# Patient Record
Sex: MALE | Race: WHITE | NOT HISPANIC OR LATINO | Employment: OTHER | URBAN - NONMETROPOLITAN AREA
[De-identification: names, ages, dates, MRNs, and addresses within clinical notes are randomized per-mention and may not be internally consistent; named-entity substitution may affect disease eponyms.]

---

## 2020-01-15 ENCOUNTER — APPOINTMENT (OUTPATIENT)
Dept: CT IMAGING | Facility: HOSPITAL | Age: 85
End: 2020-01-15

## 2020-01-15 ENCOUNTER — HOSPITAL ENCOUNTER (INPATIENT)
Facility: HOSPITAL | Age: 85
LOS: 2 days | Discharge: HOME | End: 2020-01-17
Attending: EMERGENCY MEDICINE | Admitting: FAMILY MEDICINE

## 2020-01-15 DIAGNOSIS — K92.1 BLACK STOOL: ICD-10-CM

## 2020-01-15 DIAGNOSIS — R19.5 HEME POSITIVE STOOL: ICD-10-CM

## 2020-01-15 DIAGNOSIS — E87.1 HYPONATREMIA: ICD-10-CM

## 2020-01-15 DIAGNOSIS — K92.2 UPPER GI BLEED: Primary | ICD-10-CM

## 2020-01-15 LAB
ALBUMIN SERPL-MCNC: 4.1 G/DL (ref 3.5–5.2)
ALBUMIN/GLOB SERPL: 1.2 G/DL
ALP SERPL-CCNC: 90 U/L (ref 39–117)
ALT SERPL W P-5'-P-CCNC: 15 U/L (ref 1–41)
ANION GAP SERPL CALCULATED.3IONS-SCNC: 12 MMOL/L (ref 5–15)
AST SERPL-CCNC: 20 U/L (ref 1–40)
BASOPHILS # BLD AUTO: 0.02 10*3/MM3 (ref 0–0.2)
BASOPHILS NFR BLD AUTO: 0.3 % (ref 0–1.5)
BILIRUB SERPL-MCNC: 0.6 MG/DL (ref 0.2–1.2)
BILIRUB UR QL STRIP: NEGATIVE
BUN BLD-MCNC: 10 MG/DL (ref 8–23)
BUN/CREAT SERPL: 9.7 (ref 7–25)
CALCIUM SPEC-SCNC: 9.6 MG/DL (ref 8.6–10.5)
CHLORIDE SERPL-SCNC: 87 MMOL/L (ref 98–107)
CLARITY UR: CLEAR
CO2 SERPL-SCNC: 24 MMOL/L (ref 22–29)
COLOR UR: YELLOW
CREAT BLD-MCNC: 1.03 MG/DL (ref 0.76–1.27)
D-LACTATE SERPL-SCNC: 1.4 MMOL/L (ref 0.5–2)
DEPRECATED RDW RBC AUTO: 37.2 FL (ref 37–54)
DEVELOPER EXPIRATION DATE: ABNORMAL
DEVELOPER LOT NUMBER: 161
EOSINOPHIL # BLD AUTO: 0.12 10*3/MM3 (ref 0–0.4)
EOSINOPHIL NFR BLD AUTO: 1.6 % (ref 0.3–6.2)
ERYTHROCYTE [DISTWIDTH] IN BLOOD BY AUTOMATED COUNT: 13.1 % (ref 12.3–15.4)
EXPIRATION DATE: ABNORMAL
FECAL OCCULT BLOOD SCREEN, POC: POSITIVE
GFR SERPL CREATININE-BSD FRML MDRD: 68 ML/MIN/1.73
GLOBULIN UR ELPH-MCNC: 3.3 GM/DL
GLUCOSE BLD-MCNC: 111 MG/DL (ref 65–99)
GLUCOSE UR STRIP-MCNC: NEGATIVE MG/DL
HCT VFR BLD AUTO: 35.9 % (ref 37.5–51)
HCT VFR BLD AUTO: 38.9 % (ref 37.5–51)
HCT VFR BLD AUTO: 39.9 % (ref 37.5–51)
HGB BLD-MCNC: 12.7 G/DL (ref 13–17.7)
HGB BLD-MCNC: 13.9 G/DL (ref 13–17.7)
HGB BLD-MCNC: 14.6 G/DL (ref 13–17.7)
HGB UR QL STRIP.AUTO: NEGATIVE
HOLD SPECIMEN: NORMAL
HOLD SPECIMEN: NORMAL
IMM GRANULOCYTES # BLD AUTO: 0.02 10*3/MM3 (ref 0–0.05)
IMM GRANULOCYTES NFR BLD AUTO: 0.3 % (ref 0–0.5)
KETONES UR QL STRIP: ABNORMAL
LEUKOCYTE ESTERASE UR QL STRIP.AUTO: NEGATIVE
LIPASE SERPL-CCNC: 27 U/L (ref 13–60)
LYMPHOCYTES # BLD AUTO: 1.6 10*3/MM3 (ref 0.7–3.1)
LYMPHOCYTES NFR BLD AUTO: 20.9 % (ref 19.6–45.3)
Lab: 161
MCH RBC QN AUTO: 28.7 PG (ref 26.6–33)
MCHC RBC AUTO-ENTMCNC: 36.6 G/DL (ref 31.5–35.7)
MCV RBC AUTO: 78.5 FL (ref 79–97)
MONOCYTES # BLD AUTO: 0.6 10*3/MM3 (ref 0.1–0.9)
MONOCYTES NFR BLD AUTO: 7.8 % (ref 5–12)
NEGATIVE CONTROL: NEGATIVE
NEUTROPHILS # BLD AUTO: 5.3 10*3/MM3 (ref 1.7–7)
NEUTROPHILS NFR BLD AUTO: 69.1 % (ref 42.7–76)
NITRITE UR QL STRIP: NEGATIVE
NRBC BLD AUTO-RTO: 0 /100 WBC (ref 0–0.2)
PH UR STRIP.AUTO: 8.5 [PH] (ref 5–8)
PLATELET # BLD AUTO: 349 10*3/MM3 (ref 140–450)
PMV BLD AUTO: 8.2 FL (ref 6–12)
POSITIVE CONTROL: POSITIVE
POTASSIUM BLD-SCNC: 3.4 MMOL/L (ref 3.5–5.2)
PROT SERPL-MCNC: 7.4 G/DL (ref 6–8.5)
PROT UR QL STRIP: NEGATIVE
RBC # BLD AUTO: 5.08 10*6/MM3 (ref 4.14–5.8)
SODIUM BLD-SCNC: 123 MMOL/L (ref 136–145)
SP GR UR STRIP: 1.01 (ref 1–1.03)
UROBILINOGEN UR QL STRIP: ABNORMAL
WBC NRBC COR # BLD: 7.66 10*3/MM3 (ref 3.4–10.8)
WHOLE BLOOD HOLD SPECIMEN: NORMAL
WHOLE BLOOD HOLD SPECIMEN: NORMAL

## 2020-01-15 PROCEDURE — 80053 COMPREHEN METABOLIC PANEL: CPT | Performed by: EMERGENCY MEDICINE

## 2020-01-15 PROCEDURE — 25010000002 IOPAMIDOL 61 % SOLUTION: Performed by: EMERGENCY MEDICINE

## 2020-01-15 PROCEDURE — 85018 HEMOGLOBIN: CPT | Performed by: FAMILY MEDICINE

## 2020-01-15 PROCEDURE — 85014 HEMATOCRIT: CPT | Performed by: FAMILY MEDICINE

## 2020-01-15 PROCEDURE — 83690 ASSAY OF LIPASE: CPT | Performed by: EMERGENCY MEDICINE

## 2020-01-15 PROCEDURE — 99222 1ST HOSP IP/OBS MODERATE 55: CPT | Performed by: NURSE PRACTITIONER

## 2020-01-15 PROCEDURE — 83605 ASSAY OF LACTIC ACID: CPT | Performed by: EMERGENCY MEDICINE

## 2020-01-15 PROCEDURE — 25010000002 ONDANSETRON PER 1 MG: Performed by: EMERGENCY MEDICINE

## 2020-01-15 PROCEDURE — 82270 OCCULT BLOOD FECES: CPT | Performed by: EMERGENCY MEDICINE

## 2020-01-15 PROCEDURE — 99284 EMERGENCY DEPT VISIT MOD MDM: CPT

## 2020-01-15 PROCEDURE — 81003 URINALYSIS AUTO W/O SCOPE: CPT | Performed by: EMERGENCY MEDICINE

## 2020-01-15 PROCEDURE — 25010000002 MORPHINE SULFATE (PF) 2 MG/ML SOLUTION: Performed by: EMERGENCY MEDICINE

## 2020-01-15 PROCEDURE — 74177 CT ABD & PELVIS W/CONTRAST: CPT

## 2020-01-15 PROCEDURE — 85025 COMPLETE CBC W/AUTO DIFF WBC: CPT | Performed by: EMERGENCY MEDICINE

## 2020-01-15 RX ORDER — ONDANSETRON 2 MG/ML
4 INJECTION INTRAMUSCULAR; INTRAVENOUS ONCE
Status: COMPLETED | OUTPATIENT
Start: 2020-01-15 | End: 2020-01-15

## 2020-01-15 RX ORDER — AMLODIPINE BESYLATE 5 MG/1
5 TABLET ORAL 2 TIMES DAILY
Status: DISCONTINUED | OUTPATIENT
Start: 2020-01-15 | End: 2020-01-17 | Stop reason: HOSPADM

## 2020-01-15 RX ORDER — LOSARTAN POTASSIUM 50 MG/1
100 TABLET ORAL
Status: DISCONTINUED | OUTPATIENT
Start: 2020-01-15 | End: 2020-01-15

## 2020-01-15 RX ORDER — AMLODIPINE BESYLATE 5 MG/1
5 TABLET ORAL 2 TIMES DAILY
COMMUNITY

## 2020-01-15 RX ORDER — LOSARTAN POTASSIUM AND HYDROCHLOROTHIAZIDE 12.5; 1 MG/1; MG/1
1 TABLET ORAL DAILY
COMMUNITY
End: 2020-01-17 | Stop reason: HOSPADM

## 2020-01-15 RX ORDER — PROMETHAZINE HYDROCHLORIDE 25 MG/ML
12.5 INJECTION, SOLUTION INTRAMUSCULAR; INTRAVENOUS ONCE
Status: DISCONTINUED | OUTPATIENT
Start: 2020-01-15 | End: 2020-01-17 | Stop reason: HOSPADM

## 2020-01-15 RX ORDER — FAMOTIDINE 10 MG/ML
20 INJECTION, SOLUTION INTRAVENOUS ONCE
Status: COMPLETED | OUTPATIENT
Start: 2020-01-15 | End: 2020-01-15

## 2020-01-15 RX ORDER — MORPHINE SULFATE 2 MG/ML
2 INJECTION, SOLUTION INTRAMUSCULAR; INTRAVENOUS ONCE
Status: COMPLETED | OUTPATIENT
Start: 2020-01-15 | End: 2020-01-15

## 2020-01-15 RX ORDER — AMLODIPINE BESYLATE 10 MG/1
10 TABLET ORAL
Status: DISCONTINUED | OUTPATIENT
Start: 2020-01-15 | End: 2020-01-15

## 2020-01-15 RX ORDER — SODIUM CHLORIDE 0.9 % (FLUSH) 0.9 %
10 SYRINGE (ML) INJECTION AS NEEDED
Status: DISCONTINUED | OUTPATIENT
Start: 2020-01-15 | End: 2020-01-17 | Stop reason: HOSPADM

## 2020-01-15 RX ORDER — SODIUM CHLORIDE 9 MG/ML
75 INJECTION, SOLUTION INTRAVENOUS CONTINUOUS
Status: DISCONTINUED | OUTPATIENT
Start: 2020-01-15 | End: 2020-01-16

## 2020-01-15 RX ORDER — HYDROCHLOROTHIAZIDE 25 MG/1
12.5 TABLET ORAL DAILY
Status: DISCONTINUED | OUTPATIENT
Start: 2020-01-15 | End: 2020-01-15

## 2020-01-15 RX ORDER — LOSARTAN POTASSIUM 50 MG/1
100 TABLET ORAL DAILY
Status: DISCONTINUED | OUTPATIENT
Start: 2020-01-16 | End: 2020-01-17 | Stop reason: HOSPADM

## 2020-01-15 RX ORDER — MORPHINE SULFATE 2 MG/ML
2 INJECTION, SOLUTION INTRAMUSCULAR; INTRAVENOUS EVERY 4 HOURS PRN
Status: DISCONTINUED | OUTPATIENT
Start: 2020-01-15 | End: 2020-01-17 | Stop reason: HOSPADM

## 2020-01-15 RX ADMIN — FAMOTIDINE 20 MG: 10 INJECTION, SOLUTION INTRAVENOUS at 09:33

## 2020-01-15 RX ADMIN — ONDANSETRON HYDROCHLORIDE 4 MG: 2 SOLUTION INTRAMUSCULAR; INTRAVENOUS at 09:33

## 2020-01-15 RX ADMIN — IOPAMIDOL 100 ML: 612 INJECTION, SOLUTION INTRAVENOUS at 10:52

## 2020-01-15 RX ADMIN — MORPHINE SULFATE 2 MG: 2 INJECTION, SOLUTION INTRAMUSCULAR; INTRAVENOUS at 09:33

## 2020-01-15 RX ADMIN — SODIUM CHLORIDE 1000 ML: 9 INJECTION, SOLUTION INTRAVENOUS at 09:33

## 2020-01-15 RX ADMIN — SODIUM CHLORIDE 75 ML/HR: 9 INJECTION, SOLUTION INTRAVENOUS at 22:07

## 2020-01-15 RX ADMIN — AMLODIPINE BESYLATE 5 MG: 10 TABLET ORAL at 21:06

## 2020-01-15 RX ADMIN — SODIUM CHLORIDE 8 MG/HR: 900 INJECTION INTRAVENOUS at 19:41

## 2020-01-15 RX ADMIN — SODIUM CHLORIDE 8 MG/HR: 900 INJECTION INTRAVENOUS at 15:01

## 2020-01-15 NOTE — CONSULTS
"[facility]  Initial Inpatient Consult Note    Referring Provider: Amado Falcon MD    Reason for Consultation: GIB    Subjective     History of present illness:        88 yo admitted with gi bleed and hyponatremia.     Abdominal pain x 1 week that has been intermittent in ruq and radiates to his back. He is not sure if food triggers the pain but has noted postprandial nausea. He has had \" dry heaves \" with some phlegm that has come up with no blood noted. Today he did vomit in the Emergency Room and was green in color. The pain has worsened over the last 3-4 days. Has not been eating or drinking much over the last few days.  Noted black stool yesterday that was formed. No pepto or iron pills.   Has noted weakness and some sob. No chest pain.   No fever. No hematemesis. No wt loss.     He does have history of heartburn for several years and does use otc antacid daily that does help. He takes jasmin seltzer 2-3 d/wk for several years. Takes asa daily for years.  Denies history of pud. Denies history of egd or colonoscopy in past.       Ct abdomen/pelvis with contrast  IMPRESSION:  1. Large right inguinal hernia containing normal-appearing loops of  small bowel.  2. Small hiatal hernia.  3. Multiple urinary bladder diverticula.  4. Colonic diverticulosis without evidence of acute diverticulitis.  5. Atherosclerotic disease.      Labs: hgb 14.6, wbc normal, na 123, bun 10, creat 1.03, potassium 3.4, lipase normal, lactic acid normal,   Stool heme positive.     Past Medical History:  Past Medical History:   Diagnosis Date   • Hypertension        Past Surgical History:  Past Surgical History:   Procedure Laterality Date   • FINGER SURGERY          Social History:   Social History     Tobacco Use   • Smoking status: Never Smoker   Substance Use Topics   • Alcohol use: Never     Frequency: Never        Family History:  History reviewed. No pertinent family history.    Home Meds:  Medications Prior " to Admission   Medication Sig Dispense Refill Last Dose   • amLODIPine (NORVASC) 5 MG tablet Take 5 mg by mouth 2 (Two) Times a Day.   1/15/2020 at 0800   • losartan-hydrochlorothiazide (HYZAAR) 100-12.5 MG per tablet Take 1 tablet by mouth Daily.   1/15/2020 at 0800       Current Meds:  Current Facility-Administered Medications   Medication Dose Route Frequency Provider Last Rate Last Dose   • amLODIPine (NORVASC) tablet 10 mg  10 mg Oral Q24H Amado Falcon MD       • losartan (COZAAR) tablet 100 mg  100 mg Oral Q24H Amado Falcon MD       • Morphine sulfate (PF) injection 2 mg  2 mg Intravenous Q4H PRN Amado Falcon MD       • pantoprazole (PROTONIX) 40 mg/100 mL (0.4 mg/mL) in 0.9% NS IVPB  8 mg/hr Intravenous Continuous Amado Falcon MD 20 mL/hr at 01/15/20 1501 8 mg/hr at 01/15/20 1501   • promethazine (PHENERGAN) injection 12.5 mg  12.5 mg Intravenous Once Amado Falcon MD   Stopped at 01/15/20 1108   • sodium chloride 0.9 % flush 10 mL  10 mL Intravenous PRN Amado Falcon MD           Allergies:  No Known Allergies    Review of Systems  Review of Systems   Constitutional: Positive for fatigue. Negative for appetite change, chills, fever and unexpected weight change.   HENT: Negative for sore throat and trouble swallowing.    Eyes: Negative for visual disturbance.   Respiratory: Positive for shortness of breath (mild ). Negative for wheezing.    Cardiovascular: Negative for chest pain and palpitations.   Gastrointestinal: Positive for abdominal pain, nausea and vomiting. Negative for abdominal distention, constipation and diarrhea.        As mentioned in hpi   Genitourinary: Negative for difficulty urinating and hematuria.   Musculoskeletal: Negative for arthralgias and back pain.   Skin: Negative for color change and rash.   Neurological: Positive for weakness. Negative for dizziness, seizures, syncope, light-headedness and headaches.   Hematological:  Negative for adenopathy.   Psychiatric/Behavioral: Negative for confusion. The patient is not nervous/anxious.         Objective     Vital Signs  Temp:  [97.5 °F (36.4 °C)-98.2 °F (36.8 °C)] 97.5 °F (36.4 °C)  Heart Rate:  [] 86  Resp:  [15-18] 18  BP: (125-140)/(49-78) 140/78    Physical Exam:   Physical Exam   Constitutional: He appears well-developed and well-nourished. No distress.   HENT:   Head: Normocephalic and atraumatic.   Eyes: EOM are normal. No scleral icterus.   Neck: Neck supple. No JVD present.   Cardiovascular: Normal rate, regular rhythm and normal heart sounds.   Pulmonary/Chest: Effort normal and breath sounds normal.   Abdominal: Soft. Bowel sounds are normal. He exhibits no distension. There is tenderness (mild tenderness ruq ).   Musculoskeletal: Normal range of motion. He exhibits no deformity.   Neurological: He is alert.   Skin: Skin is warm and dry. No rash noted.   Psychiatric: He has a normal mood and affect. His behavior is normal.   Vitals reviewed.      Results Review:   I reviewed the patient's new clinical results.  I reviewed the patient's new imaging results and agree with the interpretation.  I reviewed the patient's other test results and agree with the interpretation    Lab Results (most recent)     Procedure Component Value Units Date/Time    POC Occult Blood Stool [[id number]]  (Abnormal) Collected:  01/15/20 1242    Specimen:  Stool Updated:  01/15/20 1242     Fecal Occult Blood Positive     Lot Number \[id number]\     Expiration Date \7/31/2020\     DEVELOPER LOT NUMBER \161\     DEVELOPER EXPIRATION DATE \7/31/2020\     Positive Control Positive     Negative Control Negative    [place] Draw [[id number]] Collected:  01/15/20 0932    Specimen:  Blood Updated:  01/15/20 1046    Narrative:       The following orders were created for panel order [place] Draw.  Procedure                               Abnormality         Status                     ---------                                -----------         ------                     Light Blue Top[[id number]]                                   Final result               Green Top (Gel)[[id number]]                                  Final result               Lavender Top[[id number]]                                     Final result               Red Top[[id number]]                                          Final result                 Please view results for these tests on the individual orders.    Light Blue Top [[id number]] Collected:  01/15/20 0932    Specimen:  Blood Updated:  01/15/20 1046     Extra Tube hold for add-on     Comment: Auto resulted       Green Top (Gel) [[id number]] Collected:  01/15/20 0932    Specimen:  Blood Updated:  01/15/20 1046     Extra Tube Hold for add-ons.     Comment: Auto resulted.       Red Top [[id number]] Collected:  01/15/20 0932    Specimen:  Blood Updated:  01/15/20 1046     Extra Tube Hold for add-ons.     Comment: Auto resulted.       Lavender Top [[id number]] Collected:  01/15/20 0932    Specimen:  Blood Updated:  01/15/20 1046     Extra Tube hold for add-on     Comment: Auto resulted       Urinalysis With Microscopic If Indicated (No Culture) - Urine, Clean Catch [[id number]]  (Abnormal) Collected:  01/15/20 1026    Specimen:  Urine, Clean Catch Updated:  01/15/20 1033     Color, UA Yellow     Appearance, UA Clear     pH, UA 8.5     Specific Gravity, UA 1.012     Glucose, UA Negative     Ketones, UA Trace     Bilirubin, UA Negative     Blood, UA Negative     Protein, UA Negative     Leuk Esterase, UA Negative     Nitrite, UA Negative     Urobilinogen, UA 0.2 E.U./dL    Narrative:       Urine microscopic not indicated.    Comprehensive Metabolic Panel [[id number]]  (Abnormal) Collected:  01/15/20 0932    Specimen:  Blood Updated:  01/15/20 1012     Glucose 111 mg/dL      BUN 10 mg/dL      Creatinine 1.03 mg/dL      Sodium 123 mmol/L      Potassium 3.4 mmol/L      Chloride 87 mmol/L      CO2 24.0 mmol/L      Calcium  9.6 mg/dL      Total Protein 7.4 g/dL      Albumin 4.10 g/dL      ALT (SGPT) 15 U/L      AST (SGOT) 20 U/L      Alkaline Phosphatase 90 U/L      Total Bilirubin 0.6 mg/dL      eGFR Non African Amer 68 mL/min/1.73      Globulin 3.3 gm/dL      A/G Ratio 1.2 g/dL      BUN/Creatinine Ratio 9.7     Anion Gap 12.0 mmol/L     Narrative:       GFR Normal >60  Chronic Kidney Disease <60  Kidney Failure <15      Lipase [[id number]]  (Normal) Collected:  01/15/20 0932    Specimen:  Blood Updated:  01/15/20 1012     Lipase 27 U/L     Lactic Acid, Plasma [[id number]]  (Normal) Collected:  01/15/20 0932    Specimen:  Blood Updated:  01/15/20 1002     Lactate 1.4 mmol/L     CBC & Differential [[id number]] Collected:  01/15/20 0932    Specimen:  Blood Updated:  01/15/20 0948    Narrative:       The following orders were created for panel order CBC & Differential.  Procedure                               Abnormality         Status                     ---------                               -----------         ------                     CBC Auto Differential[[id number]]        Abnormal            Final result                 Please view results for these tests on the individual orders.    CBC Auto Differential [[id number]]  (Abnormal) Collected:  01/15/20 0932    Specimen:  Blood Updated:  01/15/20 0948     WBC 7.66 10*3/mm3      RBC 5.08 10*6/mm3      Hemoglobin 14.6 g/dL      Hematocrit 39.9 %      MCV 78.5 fL      MCH 28.7 pg      MCHC 36.6 g/dL      RDW 13.1 %      RDW-SD 37.2 fl      MPV 8.2 fL      Platelets 349 10*3/mm3      Neutrophil % 69.1 %      Lymphocyte % 20.9 %      Monocyte % 7.8 %      Eosinophil % 1.6 %      Basophil % 0.3 %      Immature Grans % 0.3 %      Neutrophils, Absolute 5.30 10*3/mm3      Lymphocytes, Absolute 1.60 10*3/mm3      Monocytes, Absolute 0.60 10*3/mm3      Eosinophils, Absolute 0.12 10*3/mm3      Basophils, Absolute 0.02 10*3/mm3      Immature Grans, Absolute 0.02 10*3/mm3      nRBC 0.0 /100 WBC              Radiology:  Imaging Results (Last 24 Hours)     Procedure Component Value Units Date/Time    CT Abdomen Pelvis With Contrast [[id number]] Collected:  01/15/20 1158     Updated:  01/15/20 1212    Narrative:       EXAMINATION: CT ABDOMEN PELVIS W CONTRAST- 1/15/2020 11:58 AM CST     HISTORY: Abdominal pain     COMPARISON: None     DOSE: 528 mGy-cm     TECHNIQUE: Sequential imaging was performed from the lung bases through  the pubic symphysis after the administration of IV contrast.  Sagittal  and coronal reformations were made from the original source data and  reviewed. Automated exposure control was also utilized to decrease  patient radiation dose.     FINDINGS:   The visualized heart appears normal in size. The lung bases appear  clear.     The liver, gallbladder, pancreas, and adrenal glands appear grossly  normal. A tiny hypodensity is seen in the spleen, too small to  characterize. The kidneys enhance symmetrically and appear grossly  normal. The ureters are decompressed bilaterally.     The main portal and splenic veins and IVC appear grossly normal. The  abdominal aorta appears normal in caliber. The origins of the celiac  axis, superior mesenteric artery, and inferior mesenteric artery enhance  normally. There are scattered atherosclerotic calcifications of the  aorta and its branch vessels.     No pathologically enlarged central mesenteric or retroperitoneal lymph  nodes are identified.     There is a small hiatal hernia. The stomach and small bowel appear  normal in caliber. There are scattered colonic diverticula without  evidence of acute diverticulitis. No free air or free fluid is seen in  the abdomen. There is a tiny fat-containing umbilical hernia.     Multiple urinary bladder diverticula are present. Prostate appears  mildly enlarged. No free fluid is seen in the pelvis. There is a large  direct right inguinal hernia which contains normal-appearing loops of  small bowel. This results in  significant right scrotal enlargement. A  small fat-containing left inguinal hernia is also identified. No  pathologically enlarged pelvic or inguinal lymph nodes are identified.     Review of the visualized osseous structures demonstrates no acute or  aggressive lesions. Degenerative changes are noted in the spine.       Impression:       1. Large right inguinal hernia containing normal-appearing loops of  small bowel.  2. Small hiatal hernia.  3. Multiple urinary bladder diverticula.  4. Colonic diverticulosis without evidence of acute diverticulitis.  5. Atherosclerotic disease.           This report was finalized on 01/15/2020 12:09 by Dr. Alfredo Kaplan MD.            Assessment/Plan       Upper GI bleed      1. RUQ pain  2. Nausea/vomiting.   3. Heme pos stool.  4. Report of black stool  5. Asa and jasmin seltzer chronic use  6. Hyponatremia       Differential diagnoses discussed. I do recommend egd in am for further evaluation and he is agreeable. His sodium is low so will review in am prior to egd.   He is to get ivf's and recheck labs in am. Agree with protonix. May have clear liquids. Continue to monitor hgb and transfuse as needed.   Further orders pending egd results.              I discussed the patients findings and my recommendations with patient and family      EMR Dragon/transcription disclaimer:  Much of this encounter note is electronic transcription/translation of spoken language to printed text.  The electronic translation of spoken language may be erroneous, or at times, nonsensical words or phrases may be inadvertently transcribed.  Although I have reviewed the note for such errors, some may still exist.    Marina Palmer APRN 2:33 PM    · I have seen the patient personally with evaluation and plan of care reviewed and developed with APRN and nursing staff. Where indicated, I have addended and/or modified the above history of present illness, physical examination, and assessment and plan to reflect  my findings and impressions. Essential elements of the care plan were discussed with APRN above.  Agree with findings and assessment/plan as documented above.        Nathanael Meyer MD  01/15/20  3:19 PM

## 2020-01-15 NOTE — PLAN OF CARE
Patient c/o intermittent abdominal pain. Medication offered but refused. Up ad phylicia. No BM since transfer. Clear liquids until midnight. NPO after midnight. Voiding without difficulty. No signs of distress at this time. Will cont to monitor.  Problem: Patient Care Overview  Goal: Plan of Care Review  Outcome: Ongoing (interventions implemented as appropriate)  Goal: Individualization and Mutuality  Outcome: Ongoing (interventions implemented as appropriate)  Goal: Discharge Needs Assessment  Outcome: Ongoing (interventions implemented as appropriate)  Goal: Interprofessional Rounds/Family Conf  Outcome: Ongoing (interventions implemented as appropriate)     Problem: Gastrointestinal Bleeding (Adult)  Goal: Signs and Symptoms of Listed Potential Problems Will be Absent, Minimized or Managed (Gastrointestinal Bleeding)  Outcome: Ongoing (interventions implemented as appropriate)

## 2020-01-15 NOTE — H&P
[facility]  HISTORY AND PHYSICAL    Date of Admission: 1/15/2020  Primary Care Physician: Deon Lindsey MD    Subjective     Chief Complaint: Abdominal pain    History of Present Illness  Mr. Vazquez is a pleasant gentleman who works as a  and previously worked as a .  He is also an [employer].  He presents today endorsing abdominal pain and melena.      He says he's been having abdominal pain for a week now.  He says it's right upper quadrant pain at times.  He also has midepigastric pain at times.  He does take extra Aspirin at times for pain.  He denies using Motrin, Aleve, Mobic, or Relafen.  He denies tobacco or alcohol use.      His pain is sharp.  It is intermittent.  It radiates to his back.  He has associated nausea with it.  He has not been eating or drinking very well the last few days.          Review of Systems   Constitutional: Positive for fatigue. Negative for fever.   HENT: Negative for congestion and ear pain.    Eyes: Negative for redness and visual disturbance.   Respiratory: Negative for cough, shortness of breath and wheezing.    Cardiovascular: Negative for chest pain and palpitations.   Gastrointestinal: Positive for abdominal pain, blood in stool and nausea. Negative for diarrhea and vomiting.   Endocrine: Negative for cold intolerance and heat intolerance.   Genitourinary: Negative for dysuria and frequency.   Musculoskeletal: Negative for arthralgias and back pain.   Skin: Negative for rash and wound.   Neurological: Negative for dizziness and headaches.   Psychiatric/Behavioral: Negative for confusion. The patient is not nervous/anxious.         Otherwise complete ROS reviewed and negative except as mentioned in the HPI.    Past Medical History:   Past Medical History:   Diagnosis Date   • Hypertension      Past Surgical History:  Past Surgical History:   Procedure Laterality Date   • FINGER  "SURGERY       Social History:  reports that he has never smoked. He does not have any smokeless tobacco history on file. He reports that he does not drink alcohol or use drugs.    Family History: HTN    Allergies:  No Known Allergies  Medications:  Prior to Admission medications    Medication Sig Start Date End Date Taking? Authorizing Provider   AMLODIPINE BENZOATE PO Take  by mouth.   Yes Ngoc Mcduffie MD   losartan 100 MG tablet 100 mg, amLODIPine 10 MG tablet 10 mg, hydroCHLOROthiazide 12.5 MG 12.5 mg Take  by mouth.   Yes Ngoc Mcduffie MD     Objective     Vital Signs: /74   Pulse 82   Temp 98.2 °F (36.8 °C)   Resp 16   Ht 175.3 cm (69\")   Wt 68 kg (150 lb)   SpO2 99%   BMI 22.15 kg/m²   Physical Exam   Constitutional: He is oriented to person, place, and time. He appears well-developed and well-nourished.   HENT:   Head: Normocephalic and atraumatic.   Right Ear: External ear normal.   Left Ear: External ear normal.   Nose: Nose normal.   Mouth/Throat: Oropharynx is clear and moist.   Eyes: Pupils are equal, round, and reactive to light. Conjunctivae and EOM are normal. Right eye exhibits no discharge. Left eye exhibits no discharge. No scleral icterus.   Neck: Normal range of motion. Neck supple. No tracheal deviation present. No thyromegaly present.   Cardiovascular: Normal rate, regular rhythm, normal heart sounds and intact distal pulses. Exam reveals no gallop and no friction rub.   No murmur heard.  Pulmonary/Chest: Effort normal and breath sounds normal. No stridor. No respiratory distress. He has no wheezes. He has no rales. He exhibits no tenderness.   Abdominal: Soft. Bowel sounds are normal. He exhibits no distension and no mass. There is tenderness in the right upper quadrant and epigastric area. There is no rebound and no guarding. No hernia.   Musculoskeletal: Normal range of motion. He exhibits no edema or deformity.   Lymphadenopathy:     He has no cervical " adenopathy.   Neurological: He is alert and oriented to person, place, and time. He has normal reflexes. He displays normal reflexes. No cranial nerve deficit. He exhibits normal muscle tone. Coordination normal.   Skin: Skin is warm and dry. No rash noted. No erythema. No pallor.   Decreased skin turgor   Psychiatric: He has a normal mood and affect. His behavior is normal. Judgment and thought content normal.   Vitals reviewed.      Results Reviewed:  Lab Results (last 24 hours)     Procedure Component Value Units Date/Time    POC Occult Blood Stool [[id number]]  (Abnormal) Collected:  01/15/20 1242    Specimen:  Stool Updated:  01/15/20 1242     Fecal Occult Blood Positive     Lot Number \[id number]\     Expiration Date \7/31/2020\     DEVELOPER LOT NUMBER \161\     DEVELOPER EXPIRATION DATE \7/31/2020\     Positive Control Positive     Negative Control Negative    [place] Draw [[id number]] Collected:  01/15/20 0932    Specimen:  Blood Updated:  01/15/20 1046    Narrative:       The following orders were created for panel order [place] Draw.  Procedure                               Abnormality         Status                     ---------                               -----------         ------                     Light Blue Top[[id number]]                                   Final result               Green Top (Gel)[[id number]]                                  Final result               Lavender Top[[id number]]                                     Final result               Red Top[[id number]]                                          Final result                 Please view results for these tests on the individual orders.    Light Blue Top [[id number]] Collected:  01/15/20 0932    Specimen:  Blood Updated:  01/15/20 1046     Extra Tube hold for add-on     Comment: Auto resulted       Green Top (Gel) [[id number]] Collected:  01/15/20 0932    Specimen:  Blood Updated:  01/15/20 1046     Extra Tube Hold for add-ons.     Comment:  Auto resulted.       Red Top [[id number]] Collected:  01/15/20 0932    Specimen:  Blood Updated:  01/15/20 1046     Extra Tube Hold for add-ons.     Comment: Auto resulted.       Lavender Top [[id number]] Collected:  01/15/20 0932    Specimen:  Blood Updated:  01/15/20 1046     Extra Tube hold for add-on     Comment: Auto resulted       Urinalysis With Microscopic If Indicated (No Culture) - Urine, Clean Catch [[id number]]  (Abnormal) Collected:  01/15/20 1026    Specimen:  Urine, Clean Catch Updated:  01/15/20 1033     Color, UA Yellow     Appearance, UA Clear     pH, UA 8.5     Specific Gravity, UA 1.012     Glucose, UA Negative     Ketones, UA Trace     Bilirubin, UA Negative     Blood, UA Negative     Protein, UA Negative     Leuk Esterase, UA Negative     Nitrite, UA Negative     Urobilinogen, UA 0.2 E.U./dL    Narrative:       Urine microscopic not indicated.    Comprehensive Metabolic Panel [[id number]]  (Abnormal) Collected:  01/15/20 0932    Specimen:  Blood Updated:  01/15/20 1012     Glucose 111 mg/dL      BUN 10 mg/dL      Creatinine 1.03 mg/dL      Sodium 123 mmol/L      Potassium 3.4 mmol/L      Chloride 87 mmol/L      CO2 24.0 mmol/L      Calcium 9.6 mg/dL      Total Protein 7.4 g/dL      Albumin 4.10 g/dL      ALT (SGPT) 15 U/L      AST (SGOT) 20 U/L      Alkaline Phosphatase 90 U/L      Total Bilirubin 0.6 mg/dL      eGFR Non African Amer 68 mL/min/1.73      Globulin 3.3 gm/dL      A/G Ratio 1.2 g/dL      BUN/Creatinine Ratio 9.7     Anion Gap 12.0 mmol/L     Narrative:       GFR Normal >60  Chronic Kidney Disease <60  Kidney Failure <15      Lipase [[id number]]  (Normal) Collected:  01/15/20 0932    Specimen:  Blood Updated:  01/15/20 1012     Lipase 27 U/L     Lactic Acid, Plasma [[id number]]  (Normal) Collected:  01/15/20 0932    Specimen:  Blood Updated:  01/15/20 1002     Lactate 1.4 mmol/L     CBC & Differential [[id number]] Collected:  01/15/20 0932    Specimen:  Blood Updated:  01/15/20 0948     Narrative:       The following orders were created for panel order CBC & Differential.  Procedure                               Abnormality         Status                     ---------                               -----------         ------                     CBC Auto Differential[[id number]]        Abnormal            Final result                 Please view results for these tests on the individual orders.    CBC Auto Differential [[id number]]  (Abnormal) Collected:  01/15/20 0932    Specimen:  Blood Updated:  01/15/20 0948     WBC 7.66 10*3/mm3      RBC 5.08 10*6/mm3      Hemoglobin 14.6 g/dL      Hematocrit 39.9 %      MCV 78.5 fL      MCH 28.7 pg      MCHC 36.6 g/dL      RDW 13.1 %      RDW-SD 37.2 fl      MPV 8.2 fL      Platelets 349 10*3/mm3      Neutrophil % 69.1 %      Lymphocyte % 20.9 %      Monocyte % 7.8 %      Eosinophil % 1.6 %      Basophil % 0.3 %      Immature Grans % 0.3 %      Neutrophils, Absolute 5.30 10*3/mm3      Lymphocytes, Absolute 1.60 10*3/mm3      Monocytes, Absolute 0.60 10*3/mm3      Eosinophils, Absolute 0.12 10*3/mm3      Basophils, Absolute 0.02 10*3/mm3      Immature Grans, Absolute 0.02 10*3/mm3      nRBC 0.0 /100 WBC         Imaging Results (Last 24 Hours)     Procedure Component Value Units Date/Time    CT Abdomen Pelvis With Contrast [[id number]] Collected:  01/15/20 1158     Updated:  01/15/20 1212    Narrative:       EXAMINATION: CT ABDOMEN PELVIS W CONTRAST- 1/15/2020 11:58 AM CST     HISTORY: Abdominal pain     COMPARISON: None     DOSE: 528 mGy-cm     TECHNIQUE: Sequential imaging was performed from the lung bases through  the pubic symphysis after the administration of IV contrast.  Sagittal  and coronal reformations were made from the original source data and  reviewed. Automated exposure control was also utilized to decrease  patient radiation dose.     FINDINGS:   The visualized heart appears normal in size. The lung bases appear  clear.     The liver, gallbladder,  pancreas, and adrenal glands appear grossly  normal. A tiny hypodensity is seen in the spleen, too small to  characterize. The kidneys enhance symmetrically and appear grossly  normal. The ureters are decompressed bilaterally.     The main portal and splenic veins and IVC appear grossly normal. The  abdominal aorta appears normal in caliber. The origins of the celiac  axis, superior mesenteric artery, and inferior mesenteric artery enhance  normally. There are scattered atherosclerotic calcifications of the  aorta and its branch vessels.     No pathologically enlarged central mesenteric or retroperitoneal lymph  nodes are identified.     There is a small hiatal hernia. The stomach and small bowel appear  normal in caliber. There are scattered colonic diverticula without  evidence of acute diverticulitis. No free air or free fluid is seen in  the abdomen. There is a tiny fat-containing umbilical hernia.     Multiple urinary bladder diverticula are present. Prostate appears  mildly enlarged. No free fluid is seen in the pelvis. There is a large  direct right inguinal hernia which contains normal-appearing loops of  small bowel. This results in significant right scrotal enlargement. A  small fat-containing left inguinal hernia is also identified. No  pathologically enlarged pelvic or inguinal lymph nodes are identified.     Review of the visualized osseous structures demonstrates no acute or  aggressive lesions. Degenerative changes are noted in the spine.       Impression:       1. Large right inguinal hernia containing normal-appearing loops of  small bowel.  2. Small hiatal hernia.  3. Multiple urinary bladder diverticula.  4. Colonic diverticulosis without evidence of acute diverticulitis.  5. Atherosclerotic disease.           This report was finalized on 01/15/2020 12:09 by Dr. Alfredo Kaplan MD.        I have personally reviewed and interpreted the radiology studies and ECG obtained at time of admission.          Assessment / Plan     Assessment:   Active Hospital Problems    Diagnosis   • Upper GI bleed     1.  Upper GI Bleed  -Protonix Drip  -consult GI  -counseled on avoiding NSAIDs  -Trend H&H  -Transfuse as indicated    2.  Suspect Peptic Ulcer Disease  -Protonix Drip  -consult GI  -counseled on avoiding NSAIDs  -Trend H&H  -Transfuse as indicated    3.  Hyponatremia  -IVF    4.  HTN  -Norvasc  -Losartan         Code Status: full      I discussed the patient's findings and my recommendations with the patient, patient's family, GI Team--Marina Palmer    Estimated length of stay 2-3 days    Amado Falcon MD   01/15/20   1:16 PM

## 2020-01-15 NOTE — H&P (VIEW-ONLY)
"[facility] Gastroenterology  Initial Inpatient Consult Note    Referring Provider: Amado Falcon MD    Reason for Consultation: GIB    Subjective     History of present illness:        86 yo admitted with gi bleed and hyponatremia.     Abdominal pain x 1 week that has been intermittent in ruq and radiates to his back. He is not sure if food triggers the pain but has noted postprandial nausea. He has had \" dry heaves \" with some phlegm that has come up with no blood noted. Today he did vomit in the Emergency Room and was green in color. The pain has worsened over the last 3-4 days. Has not been eating or drinking much over the last few days.  Noted black stool yesterday that was formed. No pepto or iron pills.   Has noted weakness and some sob. No chest pain.   No fever. No hematemesis. No wt loss.     He does have history of heartburn for several years and does use otc antacid daily that does help. He takes jasmin seltzer 2-3 d/wk for several years. Takes asa daily for years.  Denies history of pud. Denies history of egd or colonoscopy in past.       Ct abdomen/pelvis with contrast  IMPRESSION:  1. Large right inguinal hernia containing normal-appearing loops of  small bowel.  2. Small hiatal hernia.  3. Multiple urinary bladder diverticula.  4. Colonic diverticulosis without evidence of acute diverticulitis.  5. Atherosclerotic disease.      Labs: hgb 14.6, wbc normal, na 123, bun 10, creat 1.03, potassium 3.4, lipase normal, lactic acid normal,   Stool heme positive.     Past Medical History:  Past Medical History:   Diagnosis Date   • Hypertension        Past Surgical History:  Past Surgical History:   Procedure Laterality Date   • FINGER SURGERY          Social History:   Social History     Tobacco Use   • Smoking status: Never Smoker   Substance Use Topics   • Alcohol use: Never     Frequency: Never        Family History:  History reviewed. No pertinent family history.    Home Meds:  Medications Prior " to Admission   Medication Sig Dispense Refill Last Dose   • amLODIPine (NORVASC) 5 MG tablet Take 5 mg by mouth 2 (Two) Times a Day.   1/15/2020 at 0800   • losartan-hydrochlorothiazide (HYZAAR) 100-12.5 MG per tablet Take 1 tablet by mouth Daily.   1/15/2020 at 0800       Current Meds:  Current Facility-Administered Medications   Medication Dose Route Frequency Provider Last Rate Last Dose   • amLODIPine (NORVASC) tablet 10 mg  10 mg Oral Q24H Amado Falcon MD       • losartan (COZAAR) tablet 100 mg  100 mg Oral Q24H Amado Falcon MD       • Morphine sulfate (PF) injection 2 mg  2 mg Intravenous Q4H PRN Amado Falcon MD       • pantoprazole (PROTONIX) 40 mg/100 mL (0.4 mg/mL) in 0.9% NS IVPB  8 mg/hr Intravenous Continuous Amado Falcon MD 20 mL/hr at 01/15/20 1501 8 mg/hr at 01/15/20 1501   • promethazine (PHENERGAN) injection 12.5 mg  12.5 mg Intravenous Once Amado Falcon MD   Stopped at 01/15/20 1108   • sodium chloride 0.9 % flush 10 mL  10 mL Intravenous PRN Amado Falcon MD           Allergies:  No Known Allergies    Review of Systems  Review of Systems   Constitutional: Positive for fatigue. Negative for appetite change, chills, fever and unexpected weight change.   HENT: Negative for sore throat and trouble swallowing.    Eyes: Negative for visual disturbance.   Respiratory: Positive for shortness of breath (mild ). Negative for wheezing.    Cardiovascular: Negative for chest pain and palpitations.   Gastrointestinal: Positive for abdominal pain, nausea and vomiting. Negative for abdominal distention, constipation and diarrhea.        As mentioned in hpi   Genitourinary: Negative for difficulty urinating and hematuria.   Musculoskeletal: Negative for arthralgias and back pain.   Skin: Negative for color change and rash.   Neurological: Positive for weakness. Negative for dizziness, seizures, syncope, light-headedness and headaches.   Hematological:  Negative for adenopathy.   Psychiatric/Behavioral: Negative for confusion. The patient is not nervous/anxious.         Objective     Vital Signs  Temp:  [97.5 °F (36.4 °C)-98.2 °F (36.8 °C)] 97.5 °F (36.4 °C)  Heart Rate:  [] 86  Resp:  [15-18] 18  BP: (125-140)/(49-78) 140/78    Physical Exam:   Physical Exam   Constitutional: He appears well-developed and well-nourished. No distress.   HENT:   Head: Normocephalic and atraumatic.   Eyes: EOM are normal. No scleral icterus.   Neck: Neck supple. No JVD present.   Cardiovascular: Normal rate, regular rhythm and normal heart sounds.   Pulmonary/Chest: Effort normal and breath sounds normal.   Abdominal: Soft. Bowel sounds are normal. He exhibits no distension. There is tenderness (mild tenderness ruq ).   Musculoskeletal: Normal range of motion. He exhibits no deformity.   Neurological: He is alert.   Skin: Skin is warm and dry. No rash noted.   Psychiatric: He has a normal mood and affect. His behavior is normal.   Vitals reviewed.      Results Review:   I reviewed the patient's new clinical results.  I reviewed the patient's new imaging results and agree with the interpretation.  I reviewed the patient's other test results and agree with the interpretation    Lab Results (most recent)     Procedure Component Value Units Date/Time    POC Occult Blood Stool [[id number]]  (Abnormal) Collected:  01/15/20 1242    Specimen:  Stool Updated:  01/15/20 1242     Fecal Occult Blood Positive     Lot Number \[id number]\     Expiration Date \7/31/2020\     DEVELOPER LOT NUMBER \[id number]\     DEVELOPER EXPIRATION DATE \7/31/2020\     Positive Control Positive     Negative Control Negative    [place] Draw [[id number]] Collected:  01/15/20 0932    Specimen:  Blood Updated:  01/15/20 1046    Narrative:       The following orders were created for panel order [place] Draw.  Procedure                               Abnormality         Status                     ---------                                -----------         ------                     Light Blue Top[[id number]]                                   Final result               Green Top (Gel)[[id number]]                                  Final result               Lavender Top[[id number]]                                     Final result               Red Top[[id number]]                                          Final result                 Please view results for these tests on the individual orders.    Light Blue Top [[id number]] Collected:  01/15/20 0932    Specimen:  Blood Updated:  01/15/20 1046     Extra Tube hold for add-on     Comment: Auto resulted       Green Top (Gel) [[id number]] Collected:  01/15/20 0932    Specimen:  Blood Updated:  01/15/20 1046     Extra Tube Hold for add-ons.     Comment: Auto resulted.       Red Top [[id number]] Collected:  01/15/20 0932    Specimen:  Blood Updated:  01/15/20 1046     Extra Tube Hold for add-ons.     Comment: Auto resulted.       Lavender Top [[id number]] Collected:  01/15/20 0932    Specimen:  Blood Updated:  01/15/20 1046     Extra Tube hold for add-on     Comment: Auto resulted       Urinalysis With Microscopic If Indicated (No Culture) - Urine, Clean Catch [[id number]]  (Abnormal) Collected:  01/15/20 1026    Specimen:  Urine, Clean Catch Updated:  01/15/20 1033     Color, UA Yellow     Appearance, UA Clear     pH, UA 8.5     Specific Gravity, UA 1.012     Glucose, UA Negative     Ketones, UA Trace     Bilirubin, UA Negative     Blood, UA Negative     Protein, UA Negative     Leuk Esterase, UA Negative     Nitrite, UA Negative     Urobilinogen, UA 0.2 E.U./dL    Narrative:       Urine microscopic not indicated.    Comprehensive Metabolic Panel [[id number]]  (Abnormal) Collected:  01/15/20 0932    Specimen:  Blood Updated:  01/15/20 1012     Glucose 111 mg/dL      BUN 10 mg/dL      Creatinine 1.03 mg/dL      Sodium 123 mmol/L      Potassium 3.4 mmol/L      Chloride 87 mmol/L      CO2 24.0 mmol/L      Calcium  9.6 mg/dL      Total Protein 7.4 g/dL      Albumin 4.10 g/dL      ALT (SGPT) 15 U/L      AST (SGOT) 20 U/L      Alkaline Phosphatase 90 U/L      Total Bilirubin 0.6 mg/dL      eGFR Non African Amer 68 mL/min/1.73      Globulin 3.3 gm/dL      A/G Ratio 1.2 g/dL      BUN/Creatinine Ratio 9.7     Anion Gap 12.0 mmol/L     Narrative:       GFR Normal >60  Chronic Kidney Disease <60  Kidney Failure <15      Lipase [[id number]]  (Normal) Collected:  01/15/20 0932    Specimen:  Blood Updated:  01/15/20 1012     Lipase 27 U/L     Lactic Acid, Plasma [[id number]]  (Normal) Collected:  01/15/20 0932    Specimen:  Blood Updated:  01/15/20 1002     Lactate 1.4 mmol/L     CBC & Differential [[id number]] Collected:  01/15/20 0932    Specimen:  Blood Updated:  01/15/20 0948    Narrative:       The following orders were created for panel order CBC & Differential.  Procedure                               Abnormality         Status                     ---------                               -----------         ------                     CBC Auto Differential[[id number]]        Abnormal            Final result                 Please view results for these tests on the individual orders.    CBC Auto Differential [[id number]]  (Abnormal) Collected:  01/15/20 0932    Specimen:  Blood Updated:  01/15/20 0948     WBC 7.66 10*3/mm3      RBC 5.08 10*6/mm3      Hemoglobin 14.6 g/dL      Hematocrit 39.9 %      MCV 78.5 fL      MCH 28.7 pg      MCHC 36.6 g/dL      RDW 13.1 %      RDW-SD 37.2 fl      MPV 8.2 fL      Platelets 349 10*3/mm3      Neutrophil % 69.1 %      Lymphocyte % 20.9 %      Monocyte % 7.8 %      Eosinophil % 1.6 %      Basophil % 0.3 %      Immature Grans % 0.3 %      Neutrophils, Absolute 5.30 10*3/mm3      Lymphocytes, Absolute 1.60 10*3/mm3      Monocytes, Absolute 0.60 10*3/mm3      Eosinophils, Absolute 0.12 10*3/mm3      Basophils, Absolute 0.02 10*3/mm3      Immature Grans, Absolute 0.02 10*3/mm3      nRBC 0.0 /100 WBC              Radiology:  Imaging Results (Last 24 Hours)     Procedure Component Value Units Date/Time    CT Abdomen Pelvis With Contrast [[id number]] Collected:  01/15/20 1158     Updated:  01/15/20 1212    Narrative:       EXAMINATION: CT ABDOMEN PELVIS W CONTRAST- 1/15/2020 11:58 AM CST     HISTORY: Abdominal pain     COMPARISON: None     DOSE: 528 mGy-cm     TECHNIQUE: Sequential imaging was performed from the lung bases through  the pubic symphysis after the administration of IV contrast.  Sagittal  and coronal reformations were made from the original source data and  reviewed. Automated exposure control was also utilized to decrease  patient radiation dose.     FINDINGS:   The visualized heart appears normal in size. The lung bases appear  clear.     The liver, gallbladder, pancreas, and adrenal glands appear grossly  normal. A tiny hypodensity is seen in the spleen, too small to  characterize. The kidneys enhance symmetrically and appear grossly  normal. The ureters are decompressed bilaterally.     The main portal and splenic veins and IVC appear grossly normal. The  abdominal aorta appears normal in caliber. The origins of the celiac  axis, superior mesenteric artery, and inferior mesenteric artery enhance  normally. There are scattered atherosclerotic calcifications of the  aorta and its branch vessels.     No pathologically enlarged central mesenteric or retroperitoneal lymph  nodes are identified.     There is a small hiatal hernia. The stomach and small bowel appear  normal in caliber. There are scattered colonic diverticula without  evidence of acute diverticulitis. No free air or free fluid is seen in  the abdomen. There is a tiny fat-containing umbilical hernia.     Multiple urinary bladder diverticula are present. Prostate appears  mildly enlarged. No free fluid is seen in the pelvis. There is a large  direct right inguinal hernia which contains normal-appearing loops of  small bowel. This results in  significant right scrotal enlargement. A  small fat-containing left inguinal hernia is also identified. No  pathologically enlarged pelvic or inguinal lymph nodes are identified.     Review of the visualized osseous structures demonstrates no acute or  aggressive lesions. Degenerative changes are noted in the spine.       Impression:       1. Large right inguinal hernia containing normal-appearing loops of  small bowel.  2. Small hiatal hernia.  3. Multiple urinary bladder diverticula.  4. Colonic diverticulosis without evidence of acute diverticulitis.  5. Atherosclerotic disease.           This report was finalized on 01/15/2020 12:09 by Dr. Alfredo Kaplan MD.            Assessment/Plan       Upper GI bleed      1. RUQ pain  2. Nausea/vomiting.   3. Heme pos stool.  4. Report of black stool  5. Asa and jasmin seltzer chronic use  6. Hyponatremia       Differential diagnoses discussed. I do recommend egd in am for further evaluation and he is agreeable. His sodium is low so will review in am prior to egd.   He is to get ivf's and recheck labs in am. Agree with protonix. May have clear liquids. Continue to monitor hgb and transfuse as needed.   Further orders pending egd results.              I discussed the patients findings and my recommendations with patient and family      EMR Dragon/transcription disclaimer:  Much of this encounter note is electronic transcription/translation of spoken language to printed text.  The electronic translation of spoken language may be erroneous, or at times, nonsensical words or phrases may be inadvertently transcribed.  Although I have reviewed the note for such errors, some may still exist.    Marina Palmer APRN 2:33 PM    · I have seen the patient personally with evaluation and plan of care reviewed and developed with APRN and nursing staff. Where indicated, I have addended and/or modified the above history of present illness, physical examination, and assessment and plan to reflect  my findings and impressions. Essential elements of the care plan were discussed with APRN above.  Agree with findings and assessment/plan as documented above.        Nathanael Meyer MD  01/15/20  3:19 PM

## 2020-01-15 NOTE — ED PROVIDER NOTES
Subjective   Patient with right upper quadrant abdominal pain going on for the past couple of days getting progressively worse along with nausea and vomiting no fever associate with this no history any weight loss no chest pain.  The pain radiates to the back no other exacerbating relieving factor associate with this      Abdominal Pain   Pain location:  RUQ  Pain quality: aching and bloating    Pain radiates to:  Back  Pain severity:  Moderate  Onset quality:  Gradual  Timing:  Constant  Progression:  Waxing and waning  Chronicity:  New  Context: not alcohol use, not awakening from sleep, not diet changes, not eating, not laxative use, not previous surgeries, not recent illness, not recent sexual activity, not sick contacts, not suspicious food intake and not trauma    Relieved by:  Nothing  Worsened by:  Nothing  Ineffective treatments:  None tried  Associated symptoms: nausea and vomiting    Associated symptoms: no anorexia, no belching, no chest pain, no chills, no constipation, no diarrhea, no fatigue, no hematemesis, no hematochezia, no hematuria, no melena and no shortness of breath    Risk factors: no alcohol abuse, no aspirin use, no NSAID use and no recent hospitalization        Review of Systems   Constitutional: Negative.  Negative for chills and fatigue.   HENT: Negative.    Eyes: Negative.    Respiratory: Negative.  Negative for shortness of breath.    Cardiovascular: Negative.  Negative for chest pain.   Gastrointestinal: Positive for abdominal pain, nausea and vomiting. Negative for anorexia, constipation, diarrhea, hematemesis, hematochezia and melena.   Endocrine: Negative.    Genitourinary: Negative.  Negative for hematuria.   Skin: Negative.    Neurological: Negative.    Hematological: Negative.    All other systems reviewed and are negative.      Past Medical History:   Diagnosis Date   • Hypertension        No Known Allergies    Past Surgical History:   Procedure Laterality Date   • FINGER  SURGERY         History reviewed. No pertinent family history.    Social History     Socioeconomic History   • Marital status:      Spouse name: Not on file   • Number of children: Not on file   • Years of education: Not on file   • Highest education level: Not on file   Tobacco Use   • Smoking status: Never Smoker   Substance and Sexual Activity   • Alcohol use: Never     Frequency: Never   • Drug use: Never   • Sexual activity: Defer           Objective   Physical Exam   Constitutional: He is oriented to person, place, and time. He appears well-developed and well-nourished.  Non-toxic appearance.   HENT:   Head: Normocephalic and atraumatic.   Mouth/Throat: Oropharynx is clear and moist.   Eyes: Pupils are equal, round, and reactive to light. Conjunctivae are normal.   Neck: Normal range of motion. Neck supple. No hepatojugular reflux and no JVD present.   Cardiovascular: Normal rate, regular rhythm, normal heart sounds and intact distal pulses. PMI is not displaced. Exam reveals no decreased pulses.   No murmur heard.  Pulmonary/Chest: Effort normal and breath sounds normal. No accessory muscle usage. No apnea. No respiratory distress. He has no decreased breath sounds. He has no wheezes.   Abdominal: Normal appearance, normal aorta and bowel sounds are normal. He exhibits no shifting dullness, no distension, no fluid wave, no abdominal bruit, no ascites, no pulsatile midline mass and no mass. There is tenderness in the right upper quadrant. There is no rigidity, no rebound, no guarding and no CVA tenderness.   Musculoskeletal: Normal range of motion.   Neurological: He is alert and oriented to person, place, and time. He has normal strength and normal reflexes. No cranial nerve deficit. GCS eye subscore is 4. GCS verbal subscore is 5. GCS motor subscore is 6.   Skin: Skin is warm and dry.   Psychiatric: He has a normal mood and affect. His behavior is normal.   Nursing note and vitals  reviewed.      Procedures           ED Course  ED Course as of Claus 15 1304   Wed Claus 15, 2020   1214 . Large right inguinal hernia containing normal-appearing loops of  small bowel.  2. Small hiatal hernia.  3. Multiple urinary bladder diverticula.  4. Colonic diverticulosis without evidence of acute diverticulitis.  5. Atherosclerotic disease.     This was discussed with the patient's family and the patient    [TS]   1301 Patient has a large inguinal hernia with no obstruction his pain in the epigastric area lab work-up essentially negative except for low sodium the family states her sodium has been low in the past I do not have any recollection of any records available over here that signified.  In the ER the patient then told me that he had some dark-colored stools at this time a Hemoccult stool performed which is dark stool is not tarry but is grossly heme positive will admit the patient to hospital he slightly tachycardic with a hyponatremia and a GI bleed    [TS]      ED Course User Index  [TS] Thee Westfall MD                                               MDM  Number of Diagnoses or Management Options  Diagnosis management comments: DD   I considered gastric etiology, gastritis, gastroenteritis, peptic ulcer disease, gastroesophageal reflux disease, , gallbladder disease, liver disease, pancreatic disease, biliary colic, cholecystitis, cholelithiasis, hepatitis, pancreatitis, cholangitis, porphyria and diabetic ketoacidosis as a possible cause of abdominal pain in this patient. This is a partial list of diagnoses considered.           Amount and/or Complexity of Data Reviewed  Clinical lab tests: ordered and reviewed  Tests in the radiology section of CPT®: ordered and reviewed  Tests in the medicine section of CPT®: reviewed and ordered    Risk of Complications, Morbidity, and/or Mortality  Presenting problems: moderate  Diagnostic procedures: moderate  Management options: moderate        Final diagnoses:    Upper GI bleed   Hyponatremia            Thee Westfall MD  01/15/20 [id number]

## 2020-01-16 ENCOUNTER — TELEPHONE (OUTPATIENT)
Dept: GASTROENTEROLOGY | Facility: CLINIC | Age: 85
End: 2020-01-16

## 2020-01-16 ENCOUNTER — APPOINTMENT (OUTPATIENT)
Dept: ULTRASOUND IMAGING | Facility: HOSPITAL | Age: 85
End: 2020-01-16

## 2020-01-16 ENCOUNTER — ANESTHESIA (OUTPATIENT)
Dept: GASTROENTEROLOGY | Facility: HOSPITAL | Age: 85
End: 2020-01-16

## 2020-01-16 ENCOUNTER — ANESTHESIA EVENT (OUTPATIENT)
Dept: GASTROENTEROLOGY | Facility: HOSPITAL | Age: 85
End: 2020-01-16

## 2020-01-16 PROBLEM — K21.00 GASTROESOPHAGEAL REFLUX DISEASE WITH ESOPHAGITIS: Status: ACTIVE | Noted: 2020-01-16

## 2020-01-16 PROBLEM — K25.9 GASTRIC ULCER WITHOUT HEMORRHAGE OR PERFORATION: Status: ACTIVE | Noted: 2020-01-16

## 2020-01-16 PROBLEM — K92.1 BLACK STOOL: Status: ACTIVE | Noted: 2020-01-15

## 2020-01-16 PROBLEM — K44.9 HIATAL HERNIA: Status: ACTIVE | Noted: 2020-01-16

## 2020-01-16 PROBLEM — E87.1 ACUTE HYPONATREMIA: Status: ACTIVE | Noted: 2020-01-16

## 2020-01-16 PROBLEM — K26.9 DUODENAL ULCER WITHOUT HEMORRHAGE OR PERFORATION: Status: ACTIVE | Noted: 2020-01-16

## 2020-01-16 PROBLEM — R19.5 HEME POSITIVE STOOL: Status: ACTIVE | Noted: 2020-01-15

## 2020-01-16 LAB
ANION GAP SERPL CALCULATED.3IONS-SCNC: 9 MMOL/L (ref 5–15)
BUN BLD-MCNC: 7 MG/DL (ref 8–23)
BUN/CREAT SERPL: 7.8 (ref 7–25)
CALCIUM SPEC-SCNC: 8.5 MG/DL (ref 8.6–10.5)
CHLORIDE SERPL-SCNC: 99 MMOL/L (ref 98–107)
CO2 SERPL-SCNC: 24 MMOL/L (ref 22–29)
CREAT BLD-MCNC: 0.9 MG/DL (ref 0.76–1.27)
GFR SERPL CREATININE-BSD FRML MDRD: 80 ML/MIN/1.73
GLUCOSE BLD-MCNC: 133 MG/DL (ref 65–99)
HCT VFR BLD AUTO: 39 % (ref 37.5–51)
HGB BLD-MCNC: 13.7 G/DL (ref 13–17.7)
POTASSIUM BLD-SCNC: 3.7 MMOL/L (ref 3.5–5.2)
SODIUM BLD-SCNC: 132 MMOL/L (ref 136–145)

## 2020-01-16 PROCEDURE — 80048 BASIC METABOLIC PNL TOTAL CA: CPT | Performed by: NURSE PRACTITIONER

## 2020-01-16 PROCEDURE — 76705 ECHO EXAM OF ABDOMEN: CPT

## 2020-01-16 PROCEDURE — 85014 HEMATOCRIT: CPT | Performed by: FAMILY MEDICINE

## 2020-01-16 PROCEDURE — 0DB68ZX EXCISION OF STOMACH, VIA NATURAL OR ARTIFICIAL OPENING ENDOSCOPIC, DIAGNOSTIC: ICD-10-PCS | Performed by: INTERNAL MEDICINE

## 2020-01-16 PROCEDURE — 43239 EGD BIOPSY SINGLE/MULTIPLE: CPT | Performed by: INTERNAL MEDICINE

## 2020-01-16 PROCEDURE — 25010000002 PROPOFOL 10 MG/ML EMULSION: Performed by: NURSE ANESTHETIST, CERTIFIED REGISTERED

## 2020-01-16 PROCEDURE — 87081 CULTURE SCREEN ONLY: CPT | Performed by: INTERNAL MEDICINE

## 2020-01-16 PROCEDURE — 85018 HEMOGLOBIN: CPT | Performed by: FAMILY MEDICINE

## 2020-01-16 RX ORDER — SODIUM CHLORIDE 0.9 % (FLUSH) 0.9 %
10 SYRINGE (ML) INJECTION AS NEEDED
Status: DISCONTINUED | OUTPATIENT
Start: 2020-01-16 | End: 2020-01-17 | Stop reason: HOSPADM

## 2020-01-16 RX ORDER — PANTOPRAZOLE SODIUM 40 MG/1
40 TABLET, DELAYED RELEASE ORAL
Status: DISCONTINUED | OUTPATIENT
Start: 2020-01-17 | End: 2020-01-17 | Stop reason: HOSPADM

## 2020-01-16 RX ORDER — SODIUM CHLORIDE 9 MG/ML
500 INJECTION, SOLUTION INTRAVENOUS CONTINUOUS PRN
Status: DISCONTINUED | OUTPATIENT
Start: 2020-01-16 | End: 2020-01-17 | Stop reason: HOSPADM

## 2020-01-16 RX ORDER — PROPOFOL 10 MG/ML
VIAL (ML) INTRAVENOUS AS NEEDED
Status: DISCONTINUED | OUTPATIENT
Start: 2020-01-16 | End: 2020-01-16 | Stop reason: SURG

## 2020-01-16 RX ADMIN — PROPOFOL 100 MG: 10 INJECTION, EMULSION INTRAVENOUS at 10:40

## 2020-01-16 RX ADMIN — PROPOFOL 50 MG: 10 INJECTION, EMULSION INTRAVENOUS at 10:43

## 2020-01-16 RX ADMIN — PROPOFOL 20 MG: 10 INJECTION, EMULSION INTRAVENOUS at 10:44

## 2020-01-16 RX ADMIN — AMLODIPINE BESYLATE 5 MG: 10 TABLET ORAL at 08:58

## 2020-01-16 RX ADMIN — SODIUM CHLORIDE 8 MG/HR: 900 INJECTION INTRAVENOUS at 09:04

## 2020-01-16 RX ADMIN — SODIUM CHLORIDE 75 ML/HR: 9 INJECTION, SOLUTION INTRAVENOUS at 11:55

## 2020-01-16 RX ADMIN — SODIUM CHLORIDE 8 MG/HR: 900 INJECTION INTRAVENOUS at 14:39

## 2020-01-16 RX ADMIN — LIDOCAINE HYDROCHLORIDE 100 MG: 20 INJECTION, SOLUTION INTRAVENOUS at 10:40

## 2020-01-16 RX ADMIN — LOSARTAN POTASSIUM 100 MG: 50 TABLET, FILM COATED ORAL at 08:58

## 2020-01-16 RX ADMIN — AMLODIPINE BESYLATE 5 MG: 10 TABLET ORAL at 20:22

## 2020-01-16 RX ADMIN — SODIUM CHLORIDE 8 MG/HR: 900 INJECTION INTRAVENOUS at 04:36

## 2020-01-16 RX ADMIN — SODIUM CHLORIDE 8 MG/HR: 900 INJECTION INTRAVENOUS at 00:17

## 2020-01-16 NOTE — ANESTHESIA PREPROCEDURE EVALUATION
Anesthesia Evaluation     Patient summary reviewed   no history of anesthetic complications:  NPO Solid Status: > 8 hours  NPO Liquid Status: > 8 hours           Airway   Mallampati: I  TM distance: >3 FB  Neck ROM: full  No difficulty expected  Dental    (+) poor dentition        Pulmonary - negative pulmonary ROS   Cardiovascular     (+) hypertension,       Neuro/Psych- negative ROS  GI/Hepatic/Renal/Endo    (+)  GI bleeding ,     Musculoskeletal     Abdominal    Substance History      OB/GYN          Other        ROS/Med Hx Other: Hyponatremia                  Anesthesia Plan    ASA 3     MAC     intravenous induction     Anesthetic plan, all risks, benefits, and alternatives have been provided, discussed and informed consent has been obtained with: patient.

## 2020-01-16 NOTE — PROGRESS NOTES
Continued Stay Note  [facility]     Patient Name: Domenic Vazquez  MRN: [id number]  Today's Date: 1/16/2020    Admit Date: 1/15/2020    Discharge Plan     Row Name 01/16/20 1105       Plan    Plan Comments  Attempted to screen pt but he is currently in Endo. Will try again later.         Discharge Codes    No documentation.             DEE DEE Quinteros

## 2020-01-16 NOTE — PROGRESS NOTES
[facility] Medicine Services  INPATIENT PROGRESS NOTE    Length of Stay: 1  Date of Admission: 1/15/2020  Primary Care Physician: Deon Lindsey MD    Subjective   Chief Complaint: Abdominal pain  HPI   Admitted with abdominal pain for the past week more in the right upper quadrant at times.  He also complained of mid epigastric abdominal pain.  He has taken extra aspirin for the pain.  He denies Motrin, Aleve, Mobic.  He complains of sharp intermittent pain that radiates to his back associated with nausea.  He reports decreased appetite over the last several days and notes some nausea after eating.  He reported dry heaves with some phlegm.  Noted a formed black stool the day before admission.  He denies iron or Pepto-Bismol.  No hematemesis or recent weight loss.  Multiple family members in the room.  He has been n.p.o. for endoscopy today.  Denies chest pain, palpitations, or shortness of breath.  Denies dysuria.  No further bowel movement since admission.    Review of Systems   Constitutional: Positive for appetite change and fatigue. Negative for chills, fever and unexpected weight change.   HENT: Negative for congestion and trouble swallowing.    Eyes: Negative for photophobia and visual disturbance.   Respiratory: Negative for cough, shortness of breath and wheezing.    Cardiovascular: Negative for chest pain, palpitations and leg swelling.   Gastrointestinal: Positive for abdominal pain (Midepigastric), blood in stool (Black stool prior to admission) and nausea. Negative for diarrhea and vomiting.   Endocrine: Negative for cold intolerance, heat intolerance and polyuria.   Genitourinary: Negative for dysuria, frequency and urgency.   Musculoskeletal: Negative for arthralgias, back pain and gait problem.   Skin: Negative for color change, pallor, rash and wound.   Allergic/Immunologic: Negative for immunocompromised state.   Neurological: Positive for weakness. Negative  for dizziness and syncope.   Hematological: Negative for adenopathy. Does not bruise/bleed easily.   Psychiatric/Behavioral: Negative for agitation, behavioral problems and confusion.     All pertinent negatives and positives are as above. All other systems have been reviewed and are negative unless otherwise stated.     Objective    Temp:  [97.4 °F (36.3 °C)-98 °F (36.7 °C)] 97.5 °F (36.4 °C)  Heart Rate:  [61-85] 74  Resp:  [16-19] 17  BP: (111-160)/(59-84) 160/77  Physical Exam   Constitutional: He is oriented to person, place, and time. He appears well-developed and well-nourished.   No acute distress.   HENT:   Head: Normocephalic and atraumatic.   Eyes: Pupils are equal, round, and reactive to light. EOM are normal.   Neck: Normal range of motion. Neck supple.   Cardiovascular: Normal rate, regular rhythm, normal heart sounds and intact distal pulses. Exam reveals no gallop and no friction rub.   No murmur heard.  Pulmonary/Chest: He has no wheezes. He has no rales.   No oxygen in use.   Abdominal: Soft. Bowel sounds are normal. There is tenderness (Midepigastric).   Genitourinary:   Genitourinary Comments: Voiding.   Musculoskeletal: Normal range of motion. He exhibits no edema or deformity.   SCDs   Neurological: He is alert and oriented to person, place, and time.   Skin: Skin is warm and dry. No erythema.   Psychiatric: He has a normal mood and affect. His behavior is normal. Judgment and thought content normal.     Results Review:  I have reviewed the labs, radiology results, and diagnostic studies.    Laboratory Data:   Results from last 7 days   Lab Units 01/16/20  0914 01/15/20  2307 01/15/20  1533 01/15/20  0932   WBC 10*3/mm3  --   --   --  7.66   HEMOGLOBIN g/dL 13.7 12.7* 13.9 14.6   HEMATOCRIT % 39.0 35.9* 38.9 39.9   PLATELETS 10*3/mm3  --   --   --  349        Results from last 7 days   Lab Units 01/16/20  0914 01/15/20  0932   SODIUM mmol/L 132* 123*   POTASSIUM mmol/L 3.7 3.4*   CHLORIDE  mmol/L 99 87*   CO2 mmol/L 24.0 24.0   BUN mg/dL 7* 10   CREATININE mg/dL 0.90 1.03   CALCIUM mg/dL 8.5* 9.6   BILIRUBIN mg/dL  --  0.6   ALK PHOS U/L  --  90   ALT (SGPT) U/L  --  15   AST (SGOT) U/L  --  20   GLUCOSE mg/dL 133* 111*     No results found for: BLOODCX, URINECX, WOUNDCX, MRSACX, RESPCX, STOOLCX  Imaging Results (All)     Procedure Component Value Units Date/Time    CT Abdomen Pelvis With Contrast [[id number]] Collected:  01/15/20 1158     Updated:  01/15/20 1212    Narrative:       EXAMINATION: CT ABDOMEN PELVIS W CONTRAST- 1/15/2020 11:58 AM CST     HISTORY: Abdominal pain     COMPARISON: None     DOSE: 528 mGy-cm     TECHNIQUE: Sequential imaging was performed from the lung bases through  the pubic symphysis after the administration of IV contrast.  Sagittal  and coronal reformations were made from the original source data and  reviewed. Automated exposure control was also utilized to decrease  patient radiation dose.     FINDINGS:   The visualized heart appears normal in size. The lung bases appear  clear.     The liver, gallbladder, pancreas, and adrenal glands appear grossly  normal. A tiny hypodensity is seen in the spleen, too small to  characterize. The kidneys enhance symmetrically and appear grossly  normal. The ureters are decompressed bilaterally.     The main portal and splenic veins and IVC appear grossly normal. The  abdominal aorta appears normal in caliber. The origins of the celiac  axis, superior mesenteric artery, and inferior mesenteric artery enhance  normally. There are scattered atherosclerotic calcifications of the  aorta and its branch vessels.     No pathologically enlarged central mesenteric or retroperitoneal lymph  nodes are identified.     There is a small hiatal hernia. The stomach and small bowel appear  normal in caliber. There are scattered colonic diverticula without  evidence of acute diverticulitis. No free air or free fluid is seen in  the abdomen. There is a  tiny fat-containing umbilical hernia.     Multiple urinary bladder diverticula are present. Prostate appears  mildly enlarged. No free fluid is seen in the pelvis. There is a large  direct right inguinal hernia which contains normal-appearing loops of  small bowel. This results in significant right scrotal enlargement. A  small fat-containing left inguinal hernia is also identified. No  pathologically enlarged pelvic or inguinal lymph nodes are identified.     Review of the visualized osseous structures demonstrates no acute or  aggressive lesions. Degenerative changes are noted in the spine.       Impression:       1. Large right inguinal hernia containing normal-appearing loops of  small bowel.  2. Small hiatal hernia.  3. Multiple urinary bladder diverticula.  4. Colonic diverticulosis without evidence of acute diverticulitis.  5. Atherosclerotic disease.           This report was finalized on 01/15/2020 12:09 by Dr. Alfredo Kaplan MD.        Endoscopy 1/16/2020  - Medium-sized hiatal hernia.  - LA Grade D reflux esophagitis.  - Non-bleeding gastric ulcer with no stigmata of bleeding.  - Multiple non-bleeding duodenal ulcers with pigmented material.  - Biopsies were taken with a cold forceps for Helicobacter pylori testing.    Intake/Output    Intake/Output Summary (Last 24 hours) at 1/16/2020 1612  Last data filed at 1/16/2020 1154  Gross per 24 hour   Intake 1127 ml   Output --   Net 1127 ml       Scheduled Meds    amLODIPine 5 mg Oral BID   losartan 100 mg Oral Daily   [START ON 1/17/2020] pantoprazole 40 mg Oral Q AM   promethazine 12.5 mg Intravenous Once       I have reviewed the patient current medications.     Assessment/Plan     Active Hospital Problems    Diagnosis   • **Upper GI bleed   • Hiatal hernia   • Gastroesophageal reflux disease with esophagitis   • Gastric ulcer without hemorrhage or perforation   • Duodenal ulcer without hemorrhage or perforation   • Acute hyponatremia   • Heme positive  stool     Added automatically from request for surgery [id number]     • Melena     Added automatically from request for surgery [id number]       Plan:  1.  Presented with abdominal pain, midepigastric, right upper quadrant for the past week associated with nausea.  Has taken extra aspirin.  Reported melena the day before admission.  Complained of sharp intermittent pain that radiated to his back.  Hemoccult positive stool  2.  GI consulted.  Dr. Meyer performed endoscopy today with LA grade D reflux esophagitis.  Nonbleeding gastric ulcer, multiple nonbleeding duodenal ulcers.  Biopsies taken for H. pylori.  Recommends Protonix 40 mg daily.  Avoid ibuprofen, naproxen, NSAIDs  3.  Results of endoscopy and plans discussed with patient.  4.  Protonix drip.  Will switch to oral Protonix 40 mg daily.  5.  Discontinue IV fluids and allow regular diet.  6.  Check right upper quadrant ultrasound  7.  H/H remains stable.  14.6/39.9 on admission 13.7/39 today  8.  Home medications reviewed and resumed if appropriate.  9.  SCDs for deep vein thrombosis prophylaxis  10.  Sodium 123 on admission improved to 132 with hydration.  11.  BMP, CBC tomorrow    His wife will act as his surrogate decision-maker  The above documentation resulted from a face-to-face encounter by me Leslie CANTU, [facility].    Discharge Planning: I expect patient to be discharged to home in 1 days.    ALONDRA Frias   01/16/20   4:12 PM

## 2020-01-16 NOTE — PLAN OF CARE
Problem: Patient Care Overview  Goal: Plan of Care Review  Outcome: Ongoing (interventions implemented as appropriate)  Flowsheets  Taken 1/16/2020 1553 by Petra Lopes RN  Progress: improving  Taken 1/16/2020 1115 by Nannette Ortiz RN  Plan of Care Reviewed With: patient  Note:   Endo completed showing esophagitis, multiple  nonbleeding ulcers and hiatal hernia. H/H changed to daily. Protonix gtt continues. Tolerated a regular diet. For US of GB this afternoon and possible discharge in AM.  Goal: Individualization and Mutuality  Outcome: Ongoing (interventions implemented as appropriate)  Flowsheets (Taken 1/16/2020 1553)  Patient Specific Goals (Include Timeframe): Tolerating regular diet prior to discharge 1/17/2020  How to Address Anxieties/Fears: Answer any questions  Patient Specific Interventions: IV fluids, Protonix gtt continued.  What Information Would Help Us Give You More Personalized Care?: None  How Would You and/or Your Support Person Like to Participate in Your Care?: Keep updated on treatment plan  What Anxieties, Fears, Concerns, or Questions Do You Have About Your Care?: Concerned about GB function  Patient Specific Preferences: None  Goal: Discharge Needs Assessment  Outcome: Ongoing (interventions implemented as appropriate)  Flowsheets (Taken 1/15/2020 1925 by Ellen Ballard, RN)  Equipment Currently Used at Home: none  Transportation Anticipated: family or friend will provide  Patient/Family Anticipated Services at Transition: none  Patient/Family Anticipates Transition to: home  Goal: Interprofessional Rounds/Family Conf  Outcome: Ongoing (interventions implemented as appropriate)     Problem: Gastrointestinal Bleeding (Adult)  Goal: Signs and Symptoms of Listed Potential Problems Will be Absent, Minimized or Managed (Gastrointestinal Bleeding)  Outcome: Ongoing (interventions implemented as appropriate)  Flowsheets (Taken 1/16/2020 0251 by Julieta Dominguez, RN)  Problems Assessed (GI  Bleeding): all  Problems Present (GI Bleeding): situational response     Problem: Fall Risk (Adult)  Goal: Identify Related Risk Factors and Signs and Symptoms  Outcome: Ongoing (interventions implemented as appropriate)  Flowsheets (Taken 1/16/2020 4885)  Related Risk Factors (Fall Risk): age-related changes; environment unfamiliar  Signs and Symptoms (Fall Risk): presence of risk factors  Goal: Absence of Fall  Outcome: Ongoing (interventions implemented as appropriate)  Flowsheets (Taken 1/16/2020 9919)  Absence of Fall: making progress toward outcome

## 2020-01-16 NOTE — PLAN OF CARE
Problem: Patient Care Overview  Goal: Plan of Care Review  Outcome: Ongoing (interventions implemented as appropriate)  Flowsheets (Taken 1/16/2020 0251)  Progress: no change  Plan of Care Reviewed With: patient  Outcome Summary: Pt denies pain or nausea. NPO for endo. IVF and Protonix drip. Voiding. Grandson at bedside. No BM this shift. VSS. Will cont to monitor.

## 2020-01-16 NOTE — TELEPHONE ENCOUNTER
He is in the hospital and will go home either today or tomorrow.  Can you set him up for an outpatient endoscopy either here or at [place] in 6 to 8 weeks approximately

## 2020-01-17 ENCOUNTER — PREP FOR SURGERY (OUTPATIENT)
Dept: OTHER | Facility: HOSPITAL | Age: 85
End: 2020-01-17

## 2020-01-17 ENCOUNTER — TELEPHONE (OUTPATIENT)
Dept: GASTROENTEROLOGY | Facility: CLINIC | Age: 85
End: 2020-01-17

## 2020-01-17 VITALS
HEART RATE: 85 BPM | DIASTOLIC BLOOD PRESSURE: 71 MMHG | RESPIRATION RATE: 16 BRPM | WEIGHT: 150 LBS | TEMPERATURE: 97.7 F | OXYGEN SATURATION: 98 % | HEIGHT: 69 IN | SYSTOLIC BLOOD PRESSURE: 146 MMHG | BODY MASS INDEX: 22.22 KG/M2

## 2020-01-17 DIAGNOSIS — K21.9 GASTROESOPHAGEAL REFLUX DISEASE, ESOPHAGITIS PRESENCE NOT SPECIFIED: Primary | ICD-10-CM

## 2020-01-17 DIAGNOSIS — A04.8 H. PYLORI INFECTION: ICD-10-CM

## 2020-01-17 LAB
ANION GAP SERPL CALCULATED.3IONS-SCNC: 7 MMOL/L (ref 5–15)
BUN BLD-MCNC: 10 MG/DL (ref 8–23)
BUN/CREAT SERPL: 9.3 (ref 7–25)
CALCIUM SPEC-SCNC: 8.3 MG/DL (ref 8.6–10.5)
CHLORIDE SERPL-SCNC: 99 MMOL/L (ref 98–107)
CO2 SERPL-SCNC: 25 MMOL/L (ref 22–29)
CREAT BLD-MCNC: 1.08 MG/DL (ref 0.76–1.27)
DEPRECATED RDW RBC AUTO: 39.1 FL (ref 37–54)
ERYTHROCYTE [DISTWIDTH] IN BLOOD BY AUTOMATED COUNT: 13.3 % (ref 12.3–15.4)
GFR SERPL CREATININE-BSD FRML MDRD: 65 ML/MIN/1.73
GLUCOSE BLD-MCNC: 117 MG/DL (ref 65–99)
HCT VFR BLD AUTO: 36.5 % (ref 37.5–51)
HGB BLD-MCNC: 12.8 G/DL (ref 13–17.7)
MCH RBC QN AUTO: 28.4 PG (ref 26.6–33)
MCHC RBC AUTO-ENTMCNC: 35.1 G/DL (ref 31.5–35.7)
MCV RBC AUTO: 81.1 FL (ref 79–97)
PLATELET # BLD AUTO: 336 10*3/MM3 (ref 140–450)
PMV BLD AUTO: 8.4 FL (ref 6–12)
POTASSIUM BLD-SCNC: 3.6 MMOL/L (ref 3.5–5.2)
RBC # BLD AUTO: 4.5 10*6/MM3 (ref 4.14–5.8)
SODIUM BLD-SCNC: 131 MMOL/L (ref 136–145)
UREASE TISS QL: POSITIVE
WBC NRBC COR # BLD: 5.36 10*3/MM3 (ref 3.4–10.8)

## 2020-01-17 PROCEDURE — 80048 BASIC METABOLIC PNL TOTAL CA: CPT | Performed by: NURSE PRACTITIONER

## 2020-01-17 PROCEDURE — 99232 SBSQ HOSP IP/OBS MODERATE 35: CPT | Performed by: NURSE PRACTITIONER

## 2020-01-17 PROCEDURE — 85027 COMPLETE CBC AUTOMATED: CPT | Performed by: NURSE PRACTITIONER

## 2020-01-17 RX ORDER — PANTOPRAZOLE SODIUM 40 MG/1
40 TABLET, DELAYED RELEASE ORAL DAILY
Qty: 30 TABLET | Refills: 11 | Status: SHIPPED | OUTPATIENT
Start: 2020-01-17

## 2020-01-17 RX ORDER — CLARITHROMYCIN 500 MG/1
500 TABLET, COATED ORAL 2 TIMES DAILY
Qty: 20 TABLET | Refills: 0 | Status: SHIPPED | OUTPATIENT
Start: 2020-01-17 | End: 2020-01-27

## 2020-01-17 RX ORDER — PANTOPRAZOLE SODIUM 40 MG/1
40 TABLET, DELAYED RELEASE ORAL DAILY
Qty: 30 TABLET | Refills: 1 | Status: SHIPPED | OUTPATIENT
Start: 2020-01-17 | End: 2020-01-17

## 2020-01-17 RX ORDER — AMOXICILLIN 500 MG/1
1000 CAPSULE ORAL 2 TIMES DAILY
Qty: 40 CAPSULE | Refills: 0 | Status: SHIPPED | OUTPATIENT
Start: 2020-01-17 | End: 2020-01-27

## 2020-01-17 RX ORDER — LOSARTAN POTASSIUM 100 MG/1
100 TABLET ORAL DAILY
Qty: 30 TABLET | Refills: 1 | Status: SHIPPED | OUTPATIENT
Start: 2020-01-17

## 2020-01-17 RX ADMIN — PANTOPRAZOLE SODIUM 40 MG: 40 TABLET, DELAYED RELEASE ORAL at 05:04

## 2020-01-17 NOTE — PLAN OF CARE
Problem: Patient Care Overview  Goal: Plan of Care Review  Outcome: Ongoing (interventions implemented as appropriate)  Flowsheets (Taken 1/17/2020 3033)  Progress: improving  Plan of Care Reviewed With: patient  Outcome Summary: Pt denies pain or nausea. PO Protonix. Voiding. IID. VSS. Family at bedside. Possible DC tomorrow. Will cont to monitor.

## 2020-01-17 NOTE — TELEPHONE ENCOUNTER
Spoke with PTs wife, Vi.  PT is scheduled for 3-24-20 here at [facility].  You will not be at [place] in March.  She is going today to get the antibiotics.

## 2020-01-17 NOTE — PROGRESS NOTES
[facility]  Inpatient Progress Note    Reason for Follow Up: GI bleed    Subjective     Subjective:   Patient lying in bed with family at bedside.  He denies any further observation of GI blood loss.  Denies abdominal pain.  No nausea no vomiting.  Tolerating current diet without difficulty.  He anticipates discharge home later today.    Endoscopy 1/16/2020 showed severe esophagitis as well as 2 duodenal ulcers  No current facility-administered medications for this encounter.     Current Outpatient Medications:   •  amLODIPine (NORVASC) 5 MG tablet, Take 5 mg by mouth 2 (Two) Times a Day., Disp: , Rfl:   •  amoxicillin (AMOXIL) 500 MG capsule, Take 2 capsules by mouth 2 (Two) Times a Day for 10 days., Disp: 40 capsule, Rfl: 0  •  clarithromycin (BIAXIN) 500 MG tablet, Take 1 tablet by mouth 2 (Two) Times a Day for 10 days., Disp: 20 tablet, Rfl: 0  •  losartan (COZAAR) 100 MG tablet, Take 1 tablet by mouth Daily., Disp: 30 tablet, Rfl: 1  •  pantoprazole (PROTONIX) 40 MG EC tablet, Take 1 tablet by mouth Daily., Disp: 30 tablet, Rfl: 11    Review of Systems     Constitution:  negative for chills, fatigue and fevers  Eyes:  negativefor blurriness and change of vision  ENT:   negative for sore throat and voice change  Respiratory: negative for  cough and shortness of air  Cardiovascular:  Negative for chest pain or palpitations  Gastrointestinal:  negative for  See HPI  Genitourinary:  negativefor  blood in urine and painful urination  Integument: negative for  rash and redness  Hematologic / Lymphatic: negative for  excessive bleeding and easy bruising  Musculoskeletal: negative for  joint pain and joint stiffness out of the ordinary  Neurological:  negative for  seizures and speech abnormal  Behavioral/Psych:  negative for  anxiety and depression out of the ordinary  Endocrine: negative for  diabetes:and weight loss, unintended  Allergies / Immunologic:  negative for  anaphylaxis and  rash        Objective     Vital Signs     Body mass index is 22.15 kg/m².  No intake or output data in the 24 hours ending 01/18/20 0801  I/O this shift:  In: 1127 [I.V.:1127]  Out: -        Physical Exam:   General: patient awake, alert and cooperative   Eyes: Normal lids and lashes, no scleral icterus   Neck: supple, normal ROM   Skin: warm and dry, not jaundiced   Cardiovascular: regular rhythm and rate, no murmurs auscultated   Pulm: clear to auscultation bilaterally, regular and unlabored   Abdomen: soft, nontender, nondistended; normal bowel sounds   Rectal: deferred   Extremities: no rash or edema   Psychiatric: Normal mood and behavior; memory intact         Results Review:    I have reviewed all of the patients current test results  Results from last 7 days   Lab Units 01/17/20  0527 01/16/20  0914 01/15/20  2307  01/15/20  0932   WBC 10*3/mm3 5.36  --   --   --  7.66   HEMOGLOBIN g/dL 12.8* 13.7 12.7*   < > 14.6   HEMATOCRIT % 36.5* 39.0 35.9*   < > 39.9   PLATELETS 10*3/mm3 336  --   --   --  349    < > = values in this interval not displayed.       Results from last 7 days   Lab Units 01/17/20  0527 01/16/20  0914 01/15/20  0932   SODIUM mmol/L 131* 132* 123*   POTASSIUM mmol/L 3.6 3.7 3.4*   CHLORIDE mmol/L 99 99 87*   CO2 mmol/L 25.0 24.0 24.0   BUN mg/dL 10 7* 10   CREATININE mg/dL 1.08 0.90 1.03   CALCIUM mg/dL 8.3* 8.5* 9.6   BILIRUBIN mg/dL  --   --  0.6   ALK PHOS U/L  --   --  90   ALT (SGPT) U/L  --   --  15   AST (SGOT) U/L  --   --  20   GLUCOSE mg/dL 117* 133* 111*             Lab Results   Lab Value Date/Time    LIPASE 27 01/15/2020 0932       Radiology:    Imaging Results (Last 24 Hours)     Procedure Component Value Units Date/Time    US Gallbladder [[id number]] Collected:  01/16/20 1856     Updated:  01/16/20 1901    Narrative:       US GALLBLADDER-     Indication: Right upper quadrant abdominal pain     Comparison: CT 01/15/2020     Findings:     Pancreas and aorta are not visualized  secondary to obscuration by  overlying bowel gas. The visualized portion of the IVC is unremarkable.     Hepatic echotexture and echogenicity are within normal limits. No solid  liver mass identified. No intrahepatic biliary ductal dilatation. The  gallbladder appears normal without evidence of abnormal distention, wall  thickening, adjacent fluid, or intraluminal gallstones. The common bile  duct is normal caliber measuring 3 mm diameter. Visualized portion of  the right kidney is unremarkable.       Impression:          Negative right upper quadrant ultrasound. No gallstones identified.  This report was finalized on 01/16/2020 18:58 by Dr. Porfirio Hollis MD.            Assessment/Plan     Patient Active Problem List   Diagnosis Code   • Upper GI bleed K92.2   • Heme positive stool R19.5   • Melena K92.1   • Hiatal hernia K44.9   • Gastroesophageal reflux disease with esophagitis K21.0   • Gastric ulcer without hemorrhage or perforation K25.9   • Duodenal ulcer without hemorrhage or perforation K26.9   • Acute hyponatremia E87.1       Impression/Plan    Heme positive stool  Melena  Esophagitis  Duodenal ulcer    Patient advised to avoid Gabi-[place] as well as other anti-inflammatories.  Okay to discharge home from GI perspective.  Continue PPI outpatient.  Recommend follow-up in the office with possible endoscopy planned for 6 to 8 weeks.  H. pylori has not returned.    Alfredo Argueta, ALONDRA 9:56 AM      Clemente Hobbs DO  01/18/20  8:01 AM

## 2020-01-17 NOTE — DISCHARGE SUMMARY
[facility] Medicine Services  DISCHARGE SUMMARY     Date of Admission: 1/15/2020  Date of Discharge:  1/17/2020  Primary Care Physician: Deon Lindsey MD    Presenting Problem/History of Present Illness:  Upper GI bleed [K92.2]  Upper GI bleed [K92.2]     Discharge Diagnoses:  Active Hospital Problems    Diagnosis   • **Upper GI bleed   • Hiatal hernia   • Gastroesophageal reflux disease with esophagitis   • Gastric ulcer without hemorrhage or perforation   • Duodenal ulcer without hemorrhage or perforation   • Acute hyponatremia   • Heme positive stool     Added automatically from request for surgery [id number]     • Melena     Added automatically from request for surgery [id number]       Chief Complaint on Day of Discharge: No complaints  History of Present Illness on Day of Discharge:   Lying in bed.  Son present in room.  Patient tolerated regular diet yesterday without nausea vomiting or abdominal pain.  Results of endoscopy discussed with patient and son this morning.  Advised on bland diet and to avoid NSAIDs in the future.  Advised to hold aspirin 3 additional days.  May resume on Monday.  Patient denies chest pain, palpitations, or shortness of breath.  No oxygen in use.  Ambulating without difficulty.    Consults: Dr. Nathanael Meyer, gastroenterology    Procedures Performed:   Endoscopy 1/16/2020  - Medium-sized hiatal hernia.  - LA Grade D reflux esophagitis.  - Non-bleeding gastric ulcer with no stigmata of bleeding.  - Multiple non-bleeding duodenal ulcers with pigmented material.  - Biopsies were taken with a cold forceps for Helicobacter pylori testing.     Pertinent Test Results:   Laboratory Data:    Results from last 7 days   Lab Units 01/17/20  0527 01/16/20  0914 01/15/20  2307  01/15/20  0932   WBC 10*3/mm3 5.36  --   --   --  7.66   HEMOGLOBIN g/dL 12.8* 13.7 12.7*   < > 14.6   HEMATOCRIT % 36.5* 39.0 35.9*   < > 39.9   PLATELETS 10*3/mm3 336  --   --   --  349     < > = values in this interval not displayed.        Results from last 7 days   Lab Units 01/17/20  0527 01/16/20 0914 01/15/20  0932   SODIUM mmol/L 131* 132* 123*   POTASSIUM mmol/L 3.6 3.7 3.4*   CHLORIDE mmol/L 99 99 87*   CO2 mmol/L 25.0 24.0 24.0   BUN mg/dL 10 7* 10   CREATININE mg/dL 1.08 0.90 1.03   CALCIUM mg/dL 8.3* 8.5* 9.6   BILIRUBIN mg/dL  --   --  0.6   ALK PHOS U/L  --   --  90   ALT (SGPT) U/L  --   --  15   AST (SGOT) U/L  --   --  20   GLUCOSE mg/dL 117* 133* 111*     Lab Results (all)     Procedure Component Value Units Date/Time    Basic Metabolic Panel [[id number]]  (Abnormal) Collected:  01/17/20 0527    Specimen:  Blood Updated:  01/17/20 0652     Glucose 117 mg/dL      BUN 10 mg/dL      Creatinine 1.08 mg/dL      Sodium 131 mmol/L      Potassium 3.6 mmol/L      Chloride 99 mmol/L      CO2 25.0 mmol/L      Calcium 8.3 mg/dL      eGFR Non African Amer 65 mL/min/1.73      BUN/Creatinine Ratio 9.3     Anion Gap 7.0 mmol/L     Narrative:       GFR Normal >60  Chronic Kidney Disease <60  Kidney Failure <15      CBC (No Diff) [[id number]]  (Abnormal) Collected:  01/17/20 0527    Specimen:  Blood Updated:  01/17/20 0638     WBC 5.36 10*3/mm3      RBC 4.50 10*6/mm3      Hemoglobin 12.8 g/dL      Hematocrit 36.5 %      MCV 81.1 fL      MCH 28.4 pg      MCHC 35.1 g/dL      RDW 13.3 %      RDW-SD 39.1 fl      MPV 8.4 fL      Platelets 336 10*3/mm3     Urease For H Pylori - Tissue, Stomach [[id number]] Collected:  01/16/20 1044    Specimen:  Tissue from Stomach Updated:  01/16/20 1337    Basic Metabolic Panel [[id number]]  (Abnormal) Collected:  01/16/20 0914    Specimen:  Blood Updated:  01/16/20 1002     Glucose 133 mg/dL      BUN 7 mg/dL      Creatinine 0.90 mg/dL      Sodium 132 mmol/L      Potassium 3.7 mmol/L      Chloride 99 mmol/L      CO2 24.0 mmol/L      Calcium 8.5 mg/dL      eGFR Non African Amer 80 mL/min/1.73      BUN/Creatinine Ratio 7.8     Anion Gap 9.0 mmol/L     Narrative:       GFR  Normal >60  Chronic Kidney Disease <60  Kidney Failure <15      Hemoglobin & Hematocrit, Blood [[id number]]  (Normal) Collected:  01/16/20 0914    Specimen:  Blood Updated:  01/16/20 0936     Hemoglobin 13.7 g/dL      Hematocrit 39.0 %     Hemoglobin & Hematocrit, Blood [[id number]]  (Abnormal) Collected:  01/15/20 2307    Specimen:  Blood Updated:  01/15/20 2332     Hemoglobin 12.7 g/dL      Hematocrit 35.9 %     Hemoglobin & Hematocrit, Blood [[id number]]  (Normal) Collected:  01/15/20 1533    Specimen:  Blood Updated:  01/15/20 1551     Hemoglobin 13.9 g/dL      Hematocrit 38.9 %     POC Occult Blood Stool [[id number]]  (Abnormal) Collected:  01/15/20 1242    Specimen:  Stool Updated:  01/15/20 1242     Fecal Occult Blood Positive     Lot Number \[id number]\     Expiration Date \7/31/2020\     DEVELOPER LOT NUMBER \161\     DEVELOPER EXPIRATION DATE \7/31/2020\     Positive Control Positive     Negative Control Negative    [place] Draw [[id number]] Collected:  01/15/20 0932    Specimen:  Blood Updated:  01/15/20 1046    Narrative:       The following orders were created for panel order [place] Draw.  Procedure                               Abnormality         Status                     ---------                               -----------         ------                     Light Blue Top[[id number]]                                   Final result               Green Top (Gel)[[id number]]                                  Final result               Lavender Top[[id number]]                                     Final result               Red Top[[id number]]                                          Final result                 Please view results for these tests on the individual orders.    Light Blue Top [[id number]] Collected:  01/15/20 0932    Specimen:  Blood Updated:  01/15/20 1046     Extra Tube hold for add-on     Comment: Auto resulted       Green Top (Gel) [[id number]] Collected:  01/15/20 0932    Specimen:  Blood Updated:   01/15/20 1046     Extra Tube Hold for add-ons.     Comment: Auto resulted.       Red Top [[id number]] Collected:  01/15/20 0932    Specimen:  Blood Updated:  01/15/20 1046     Extra Tube Hold for add-ons.     Comment: Auto resulted.       Lavender Top [[id number]] Collected:  01/15/20 0932    Specimen:  Blood Updated:  01/15/20 1046     Extra Tube hold for add-on     Comment: Auto resulted       Urinalysis With Microscopic If Indicated (No Culture) - Urine, Clean Catch [[id number]]  (Abnormal) Collected:  01/15/20 1026    Specimen:  Urine, Clean Catch Updated:  01/15/20 1033     Color, UA Yellow     Appearance, UA Clear     pH, UA 8.5     Specific Gravity, UA 1.012     Glucose, UA Negative     Ketones, UA Trace     Bilirubin, UA Negative     Blood, UA Negative     Protein, UA Negative     Leuk Esterase, UA Negative     Nitrite, UA Negative     Urobilinogen, UA 0.2 E.U./dL    Narrative:       Urine microscopic not indicated.    Comprehensive Metabolic Panel [[id number]]  (Abnormal) Collected:  01/15/20 0932    Specimen:  Blood Updated:  01/15/20 1012     Glucose 111 mg/dL      BUN 10 mg/dL      Creatinine 1.03 mg/dL      Sodium 123 mmol/L      Potassium 3.4 mmol/L      Chloride 87 mmol/L      CO2 24.0 mmol/L      Calcium 9.6 mg/dL      Total Protein 7.4 g/dL      Albumin 4.10 g/dL      ALT (SGPT) 15 U/L      AST (SGOT) 20 U/L      Alkaline Phosphatase 90 U/L      Total Bilirubin 0.6 mg/dL      eGFR Non African Amer 68 mL/min/1.73      Globulin 3.3 gm/dL      A/G Ratio 1.2 g/dL      BUN/Creatinine Ratio 9.7     Anion Gap 12.0 mmol/L     Narrative:       GFR Normal >60  Chronic Kidney Disease <60  Kidney Failure <15      Lipase [[id number]]  (Normal) Collected:  01/15/20 0932    Specimen:  Blood Updated:  01/15/20 1012     Lipase 27 U/L     Lactic Acid, Plasma [[id number]]  (Normal) Collected:  01/15/20 0932    Specimen:  Blood Updated:  01/15/20 1002     Lactate 1.4 mmol/L     CBC & Differential [[id number]] Collected:   01/15/20 0932    Specimen:  Blood Updated:  01/15/20 0948    Narrative:       The following orders were created for panel order CBC & Differential.  Procedure                               Abnormality         Status                     ---------                               -----------         ------                     CBC Auto Differential[[id number]]        Abnormal            Final result                 Please view results for these tests on the individual orders.    CBC Auto Differential [[id number]]  (Abnormal) Collected:  01/15/20 0932    Specimen:  Blood Updated:  01/15/20 0948     WBC 7.66 10*3/mm3      RBC 5.08 10*6/mm3      Hemoglobin 14.6 g/dL      Hematocrit 39.9 %      MCV 78.5 fL      MCH 28.7 pg      MCHC 36.6 g/dL      RDW 13.1 %      RDW-SD 37.2 fl      MPV 8.2 fL      Platelets 349 10*3/mm3      Neutrophil % 69.1 %      Lymphocyte % 20.9 %      Monocyte % 7.8 %      Eosinophil % 1.6 %      Basophil % 0.3 %      Immature Grans % 0.3 %      Neutrophils, Absolute 5.30 10*3/mm3      Lymphocytes, Absolute 1.60 10*3/mm3      Monocytes, Absolute 0.60 10*3/mm3      Eosinophils, Absolute 0.12 10*3/mm3      Basophils, Absolute 0.02 10*3/mm3      Immature Grans, Absolute 0.02 10*3/mm3      nRBC 0.0 /100 WBC         Imaging Results (All)     Procedure Component Value Units Date/Time    US Gallbladder [[id number]] Collected:  01/16/20 1856     Updated:  01/16/20 1901    Narrative:       US GALLBLADDER-     Indication: Right upper quadrant abdominal pain     Comparison: CT 01/15/2020     Findings:     Pancreas and aorta are not visualized secondary to obscuration by  overlying bowel gas. The visualized portion of the IVC is unremarkable.     Hepatic echotexture and echogenicity are within normal limits. No solid  liver mass identified. No intrahepatic biliary ductal dilatation. The  gallbladder appears normal without evidence of abnormal distention, wall  thickening, adjacent fluid, or intraluminal gallstones.  The common bile  duct is normal caliber measuring 3 mm diameter. Visualized portion of  the right kidney is unremarkable.       Impression:          Negative right upper quadrant ultrasound. No gallstones identified.  This report was finalized on 01/16/2020 18:58 by Dr. Porfirio Hollis MD.    CT Abdomen Pelvis With Contrast [[id number]] Collected:  01/15/20 1158     Updated:  01/15/20 1212    Narrative:       EXAMINATION: CT ABDOMEN PELVIS W CONTRAST- 1/15/2020 11:58 AM CST     HISTORY: Abdominal pain     COMPARISON: None     DOSE: 528 mGy-cm     TECHNIQUE: Sequential imaging was performed from the lung bases through  the pubic symphysis after the administration of IV contrast.  Sagittal  and coronal reformations were made from the original source data and  reviewed. Automated exposure control was also utilized to decrease  patient radiation dose.     FINDINGS:   The visualized heart appears normal in size. The lung bases appear  clear.     The liver, gallbladder, pancreas, and adrenal glands appear grossly  normal. A tiny hypodensity is seen in the spleen, too small to  characterize. The kidneys enhance symmetrically and appear grossly  normal. The ureters are decompressed bilaterally.     The main portal and splenic veins and IVC appear grossly normal. The  abdominal aorta appears normal in caliber. The origins of the celiac  axis, superior mesenteric artery, and inferior mesenteric artery enhance  normally. There are scattered atherosclerotic calcifications of the  aorta and its branch vessels.     No pathologically enlarged central mesenteric or retroperitoneal lymph  nodes are identified.     There is a small hiatal hernia. The stomach and small bowel appear  normal in caliber. There are scattered colonic diverticula without  evidence of acute diverticulitis. No free air or free fluid is seen in  the abdomen. There is a tiny fat-containing umbilical hernia.     Multiple urinary bladder diverticula are present.  Prostate appears  mildly enlarged. No free fluid is seen in the pelvis. There is a large  direct right inguinal hernia which contains normal-appearing loops of  small bowel. This results in significant right scrotal enlargement. A  small fat-containing left inguinal hernia is also identified. No  pathologically enlarged pelvic or inguinal lymph nodes are identified.     Review of the visualized osseous structures demonstrates no acute or  aggressive lesions. Degenerative changes are noted in the spine.       Impression:       1. Large right inguinal hernia containing normal-appearing loops of  small bowel.  2. Small hiatal hernia.  3. Multiple urinary bladder diverticula.  4. Colonic diverticulosis without evidence of acute diverticulitis.  5. Atherosclerotic disease.   This report was finalized on 01/15/2020 12:09 by Dr. Alfredo Kaplan MD.        Culture Data:   No results found for: BLOODCX, URINECX, WOUNDCX, MRSACX, RESPCX, STOOLCX    Hospital Course  Patient is a 87 y.o. male presented to [facility] emergency room 1/15/2020 with complaints of upper abdominal pain and right upper quadrant pain the past week.  He complained of epigastric pain and had taken extra aspirin for the pain.  He denied using Motrin, Aleve or Mobic.  He reported sharp intermittent pain that radiated to his back associated with nausea, decreased appetite over the last several days.  He did report nausea after eating.  He complained of dry heaves.  Patient noted a formed black stool the day before admission.  He denied Pepto-Bismol or iron use.  He denied recent weight loss or hematemesis.  Hemoglobin 14.6, hematocrit 39.9, fecal occult blood positive, sodium 123, potassium 3.4.  Lipase 27 , lactate 1.4.  CT scan of abdomen pelvis noted large right inguinal hernia containing normal-appearing loops of small bowel.  Small hiatal hernia.  Multiple urinary bladder diverticula.  Chronic diverticulosis without evidence of acute  diverticulitis.  He received normal saline fluid bolus, Pepcid IV, Zofran, Phenergan, and morphine in the emergency room.    He was admitted to the medical floor with upper GI bleed and melena.  He was placed on a Protonix drip and hydrated with IV fluids.  Discussed avoiding NSAID use with patient.  H/H trended and remained fairly stable with hemoglobin 12.8 and hematocrit 36.5 at discharge.  Patient had no further black tarry stools.    Gastroenterology consulted and he was seen by Dr. Meyer who recommended endoscopy and patient was agreeable.  On 1/16/2020 Dr. Meyer performed endoscopy with impression medium sized hiatal hernia.  LA grade D reflux esophagitis.  Nonbleeding gastric ulcer.  Multiple nonbleeding duodenal ulcers with pigmented material.  Biopsies taken for H. pylori with results pending at discharge.  Recommendations post endoscopy included Protonix 40 mg orally daily, avoid ibuprofen, naproxen and other non-steroidal anti-inflammatory drugs.  Await H. pylori results.  Patient was allowed regular diet and tolerated without nausea, vomiting or diarrhea.  Hold aspirin until follow-up with Dr. Lindsey.  Patient reports that he only takes aspirin as prevention.    Patient complained of mid epigastric pain associate with right upper quadrant pain.  Ultrasound right upper quadrant negative.  No gallstones identified.  Results discussed with patient and son.    Sodium 123 on admission.  Hydrated with IV fluids and sodium improved to 131 at discharge.  Potassium 3.6.  HCTZ not resumed on admission or at discharge due to low sodium.  Recommend basic metabolic panel with follow-up appointment with Dr. Lindsey 1/27/2020.    Hypertension stable on home medications Norvasc and losartan.  Have discontinued HCTZ due to hyponatremia.    On 1/17/2020 he is medically stable for discharge.  No further black stools since admission.  Patient denies epigastric pain.  Endoscopy showed nonbleeding gastric ulcer and multiple  "nonbleeding duodenal ulcers.  Patient has been advised to hold aspirin to follow-up appoint with Dr. Lindsey. In addition, I have discussed avoiding NSAID use.  He will remain on Protonix 40 mg orally daily per recommendations of Dr. Meyer.  Have arranged follow-up with his primary care provider Dr. Lindsey on 1/27/20.  He will follow-up with Dr. Meyer as needed.    Physical Exam on Discharge:  /71 (BP Location: Right arm, Patient Position: Lying)   Pulse 85   Temp 97.7 °F (36.5 °C) (Oral)   Resp 16   Ht 175.3 cm (69\")   Wt 68 kg (150 lb)   SpO2 98%   BMI 22.15 kg/m²   Physical Exam   Constitutional: He is oriented to person, place, and time.   No acute distress.  Sitting up in bed.  No oxygen in use.  Son in room.   HENT:   Head: Normocephalic and atraumatic.   Eyes: Pupils are equal, round, and reactive to light. EOM are normal.   Neck: Normal range of motion. Neck supple.   Cardiovascular: Normal rate, regular rhythm, normal heart sounds and intact distal pulses. Exam reveals no gallop and no friction rub.   No murmur heard.  Pulmonary/Chest: Effort normal and breath sounds normal. He has no wheezes. He has no rales. He exhibits no tenderness.   No oxygen use.   Abdominal: Soft. Bowel sounds are normal. He exhibits no distension. There is no tenderness.   Genitourinary:   Genitourinary Comments: Voiding.   Musculoskeletal: Normal range of motion. He exhibits no edema or deformity.   Neurological: He is alert and oriented to person, place, and time. No cranial nerve deficit.   Skin: Skin is warm and dry. No rash noted. No erythema.   Psychiatric: He has a normal mood and affect. His behavior is normal. Judgment and thought content normal.     Condition on Discharge: Stable    Discharge Disposition: Home with family    Discharge Diet:   Diet Instructions     Diet: Regular      Discharge Diet:  Regular    [place]      GI bland soft diet    Activity at Discharge:   Activity Instructions     Activity as " Tolerated        As tolerated    Discharge Care Plan / Instructions:   1.  Protonix 40 mg orally daily  2.  Hold aspirin until follow-up appointment with Dr. Lindsey.  3.  Discontinue HCTZ.  New prescription for losartan 100 mg orally daily.  4.  Follow-up with Dr. Lindsey on 1/27/2020.  5.  Follow-up with Dr. Meyer 4 weeks.  6.  Basic metabolic panel with follow-up appointment with Dr. Lindsey on 1/27/2020 to follow-up hyponatremia.    Discharge Medications:     Discharge Medications      New Medications      Instructions Start Date   losartan 100 MG tablet  Commonly known as:  COZAAR   100 mg, Oral, Daily      pantoprazole 40 MG EC tablet  Commonly known as:  PROTONIX   40 mg, Oral, Daily         Continue These Medications      Instructions Start Date   amLODIPine 5 MG tablet  Commonly known as:  NORVASC   5 mg, Oral, 2 Times Daily         Stop These Medications    HYZAAR 100-12.5 MG per tablet  Generic drug:  losartan-hydrochlorothiazide          Follow-up Appointments:   Follow-up Information     Nathanael Meyer MD Follow up today.    Specialty:  Gastroenterology  Why:  4 weeks  Contact information:  [address] [address]  [phone]             Deon Lindsey MD Follow up in 1 week(s).    Specialties:  Internal Medicine, Cardiology  Why:  January 27, 2020 2:15 pm  Contact information:  [address] [place] [address] [address] [place] [address]  [phone]                 Future Appointments:  No future appointments.    Test Results Pending at Discharge: H. pylori pending    The above documentation resulted from a face-to-face encounter by me Leslie CANTU, [facility].    ALONDRA Frias  01/17/20  7:42 AM    Time: This discharge process required 35 minutes for completion.    Plan discussed with Dr. Falcon, patient, and son.    Time spent in face-to-face evaluation, chart review, planning and education 35 minutes.

## 2020-01-17 NOTE — TELEPHONE ENCOUNTER
[facility]  [address] [facility] [address]  [phone]      Guille Linares :1967 MRN:[id number]      2018 Time Session Began: 10:00am  Time Session Ended: 10:45am    Session Type:45 Minute Therapy (68007)    Others Present: NA    Intervention: Behavioral, Cognitive, Supportive    Suicide/Homicide/Violence Ideation: No    If Yes, explain: NA    Current Outpatient Medications   Medication Sig   • gentamicin (GARAMYCIN) 0.3 % ophthalmic solution Place 1 drop into right eye 4 times daily.   • lisdexamfetamine (VYVANSE) 40 MG capsule Take 1 capsule by mouth every morning.   • omeprazole (PRILOSEC) 20 MG capsule TAKE ONE CAPSULE BY MOUTH DAILY   • gemfibrozil (LOPID) 600 MG tablet TAKE ONE TABLET BY MOUTH TWICE A DAY   • DULoxetine (CYMBALTA) 20 MG capsule Take 1 capsule by mouth daily.   • DULoxetine (CYMBALTA) 60 MG capsule Take 1 capsule by mouth daily.   • gabapentin (NEURONTIN) 400 MG capsule Take 2 capsules by mouth 2 times daily.   • zolpidem (AMBIEN) 10 MG tablet Take 1 tablet by mouth at bedtime as needed for Sleep.   • ARIPiprazole (ABILIFY) 5 MG tablet Take 1 tablet by mouth daily.   • lamoTRIgine (LAMICTAL) 150 MG tablet Take 1 tablet by mouth daily.   • trihexyphenidyl (ARTANE) 2 MG tablet Take 1 tablet by mouth 2 times daily.   • traZODone (DESYREL) 100 MG tablet Take 2 tablets by mouth at bedtime.   • fluticasone (FLONASE) 50 MCG/ACT nasal spray SHAKE LIQUID AND USE 2 SPRAYS IN EACH NOSTRIL DAILY   • ACETAMINOPHEN-CAFF-BUTALBITAL (ESGIC) -40 MG Cap Take 1 capsule by mouth every 4 hours as needed (headache).   • Ascorbic Acid (VITAMIN C) 1000 MG tablet Take 1,000 mg by mouth 2 times daily.     No current facility-administered medications for this visit.        Change in Medication(s) Reported: No  If Yes, explain:NA.    Patient/Family Education Provided: Yes  Patient/Family Displays  He was in the hospital and found to have esophagitis and duodenal ulcers.  He was discharged this morning.  His H. pylori just came back and it was positive.  He is on Protonix 40 mg daily.  Recommend we also treat the H. pylori with:     Biaxin 500mg bid for 10 days and Amoxicillin 1 gram bid for 10 days.     I sent prescriptions in to his pharmacy.  Can you please contact him and ask him to get those prescriptions to begin therapy.  He also needs to take the Protonix every day.  Plan repeat endoscopy for reassessment and H. pylori testing in 2 months approximately.  As before he is willing to have this done at [facility].   Understanding: Yes    If No, explain: NA    Chief complaint in patient's own words: \"staying positive.\"    DARP:     D) Client attended individual psychotherapy session, reported no homicidal ideation, no suicidal ideation, and no AODA use concerns at present.  Client and writer discussed treatment plan, created goals, obtained signatures.  Client reported on his first meeting with his discovered biological brother, explained that it was a successful meeting and that he anticipated they would develop a friendship.  Also discussed how he had done a work training program that involved actively working on daily affirmations to change his mindset/attitude and that he was incorporating it into his thinking and finding noticeable results.  A) Writer used open ended questions, restatements, and reflections of feelings to encourage exploration of issues and foster client insight into reported concerns.  Writer guided client treatment updates.  Praised client for engaging in the practice of positive thinking and self affirmations.  R) Client was attentive in session, politely answering questions.  Client affect was appropriate to content of session, shared his observations and insights.  Client described feeling more motivated to work on giving himself praise.  P) Client to continue with individual therapy sessions, client to utilize his affirmations on a daily basis.    Need for Community Resources Assessed: Yes    Resources Needed: No    If Yes, what resources: NA    Primary Diagnosis: Bipolar I disorder, most recent mixed.  : 2 Moderate    Treatment Plan: Modified    Discharge Plan: Continue with M.D.    Next Appointment: 12.12.18  John Baum, [facility]

## 2020-03-05 PROBLEM — A04.8 H. PYLORI INFECTION: Status: ACTIVE | Noted: 2020-03-05

## 2020-03-23 ENCOUNTER — TELEPHONE (OUTPATIENT)
Dept: GASTROENTEROLOGY | Facility: CLINIC | Age: 85
End: 2020-03-23

## 2020-04-30 ENCOUNTER — TELEPHONE (OUTPATIENT)
Dept: GASTROENTEROLOGY | Facility: CLINIC | Age: 85
End: 2020-04-30

## 2020-05-04 ENCOUNTER — TELEPHONE (OUTPATIENT)
Dept: GASTROENTEROLOGY | Facility: CLINIC | Age: 85
End: 2020-05-04

## 2020-06-12 ENCOUNTER — TELEPHONE (OUTPATIENT)
Dept: GASTROENTEROLOGY | Facility: CLINIC | Age: 85
End: 2020-06-12

## 2021-10-29 ENCOUNTER — APPOINTMENT (OUTPATIENT)
Dept: GENERAL RADIOLOGY | Facility: HOSPITAL | Age: 86
End: 2021-10-29

## 2021-10-29 ENCOUNTER — HOSPITAL ENCOUNTER (EMERGENCY)
Facility: HOSPITAL | Age: 86
Discharge: HOME | End: 2021-10-29
Admitting: FAMILY MEDICINE

## 2021-10-29 ENCOUNTER — APPOINTMENT (OUTPATIENT)
Dept: ULTRASOUND IMAGING | Facility: HOSPITAL | Age: 86
End: 2021-10-29

## 2021-10-29 VITALS
HEIGHT: 70 IN | SYSTOLIC BLOOD PRESSURE: 132 MMHG | RESPIRATION RATE: 20 BRPM | OXYGEN SATURATION: 100 % | DIASTOLIC BLOOD PRESSURE: 78 MMHG | TEMPERATURE: 98.1 F | WEIGHT: 154 LBS | HEART RATE: 78 BPM | BODY MASS INDEX: 22.05 KG/M2

## 2021-10-29 DIAGNOSIS — L03.114 CELLULITIS OF LEFT UPPER EXTREMITY: Primary | ICD-10-CM

## 2021-10-29 LAB
ALBUMIN SERPL-MCNC: 4.1 G/DL (ref 3.5–5.2)
ALBUMIN/GLOB SERPL: 1.2 G/DL
ALP SERPL-CCNC: 120 U/L (ref 39–117)
ALT SERPL W P-5'-P-CCNC: 12 U/L (ref 1–41)
ANION GAP SERPL CALCULATED.3IONS-SCNC: 10 MMOL/L (ref 5–15)
AST SERPL-CCNC: 17 U/L (ref 1–40)
BASOPHILS # BLD AUTO: 0.02 10*3/MM3 (ref 0–0.2)
BASOPHILS NFR BLD AUTO: 0.2 % (ref 0–1.5)
BILIRUB SERPL-MCNC: 1 MG/DL (ref 0–1.2)
BUN SERPL-MCNC: 12 MG/DL (ref 8–23)
BUN/CREAT SERPL: 9.8 (ref 7–25)
CALCIUM SPEC-SCNC: 9 MG/DL (ref 8.6–10.5)
CHLORIDE SERPL-SCNC: 97 MMOL/L (ref 98–107)
CO2 SERPL-SCNC: 24 MMOL/L (ref 22–29)
CREAT SERPL-MCNC: 1.23 MG/DL (ref 0.76–1.27)
CRP SERPL-MCNC: 13.12 MG/DL (ref 0–0.5)
D-LACTATE SERPL-SCNC: 1.3 MMOL/L (ref 0.5–2)
DEPRECATED RDW RBC AUTO: 43.5 FL (ref 37–54)
EOSINOPHIL # BLD AUTO: 0.11 10*3/MM3 (ref 0–0.4)
EOSINOPHIL NFR BLD AUTO: 1.2 % (ref 0.3–6.2)
ERYTHROCYTE [DISTWIDTH] IN BLOOD BY AUTOMATED COUNT: 14.5 % (ref 12.3–15.4)
ERYTHROCYTE [SEDIMENTATION RATE] IN BLOOD: 34 MM/HR (ref 0–20)
GFR SERPL CREATININE-BSD FRML MDRD: 55 ML/MIN/1.73
GLOBULIN UR ELPH-MCNC: 3.5 GM/DL
GLUCOSE SERPL-MCNC: 101 MG/DL (ref 65–99)
HCT VFR BLD AUTO: 45.5 % (ref 37.5–51)
HGB BLD-MCNC: 15.1 G/DL (ref 13–17.7)
IMM GRANULOCYTES # BLD AUTO: 0.03 10*3/MM3 (ref 0–0.05)
IMM GRANULOCYTES NFR BLD AUTO: 0.3 % (ref 0–0.5)
INR PPP: 1.09 (ref 0.91–1.09)
LYMPHOCYTES # BLD AUTO: 1.19 10*3/MM3 (ref 0.7–3.1)
LYMPHOCYTES NFR BLD AUTO: 12.5 % (ref 19.6–45.3)
MCH RBC QN AUTO: 27.6 PG (ref 26.6–33)
MCHC RBC AUTO-ENTMCNC: 33.2 G/DL (ref 31.5–35.7)
MCV RBC AUTO: 83 FL (ref 79–97)
MONOCYTES # BLD AUTO: 0.56 10*3/MM3 (ref 0.1–0.9)
MONOCYTES NFR BLD AUTO: 5.9 % (ref 5–12)
NEUTROPHILS NFR BLD AUTO: 7.59 10*3/MM3 (ref 1.7–7)
NEUTROPHILS NFR BLD AUTO: 79.9 % (ref 42.7–76)
NRBC BLD AUTO-RTO: 0 /100 WBC (ref 0–0.2)
PLATELET # BLD AUTO: 276 10*3/MM3 (ref 140–450)
PMV BLD AUTO: 9.1 FL (ref 6–12)
POTASSIUM SERPL-SCNC: 3.9 MMOL/L (ref 3.5–5.2)
PROCALCITONIN SERPL-MCNC: 0.17 NG/ML (ref 0–0.25)
PROT SERPL-MCNC: 7.6 G/DL (ref 6–8.5)
PROTHROMBIN TIME: 13.7 SECONDS (ref 11.9–14.6)
RBC # BLD AUTO: 5.48 10*6/MM3 (ref 4.14–5.8)
SARS-COV-2 RNA PNL SPEC NAA+PROBE: NOT DETECTED
SODIUM SERPL-SCNC: 131 MMOL/L (ref 136–145)
WBC # BLD AUTO: 9.5 10*3/MM3 (ref 3.4–10.8)

## 2021-10-29 PROCEDURE — 25010000002 DIPHENHYDRAMINE PER 50 MG: Performed by: NURSE PRACTITIONER

## 2021-10-29 PROCEDURE — 86140 C-REACTIVE PROTEIN: CPT | Performed by: NURSE PRACTITIONER

## 2021-10-29 PROCEDURE — 80053 COMPREHEN METABOLIC PANEL: CPT | Performed by: NURSE PRACTITIONER

## 2021-10-29 PROCEDURE — 25010000002 METHYLPREDNISOLONE PER 125 MG: Performed by: NURSE PRACTITIONER

## 2021-10-29 PROCEDURE — 36415 COLL VENOUS BLD VENIPUNCTURE: CPT

## 2021-10-29 PROCEDURE — 85025 COMPLETE CBC W/AUTO DIFF WBC: CPT | Performed by: NURSE PRACTITIONER

## 2021-10-29 PROCEDURE — 99283 EMERGENCY DEPT VISIT LOW MDM: CPT

## 2021-10-29 PROCEDURE — 87635 SARS-COV-2 COVID-19 AMP PRB: CPT | Performed by: NURSE PRACTITIONER

## 2021-10-29 PROCEDURE — 25010000002 CEFAZOLIN PER 500 MG: Performed by: NURSE PRACTITIONER

## 2021-10-29 PROCEDURE — 85652 RBC SED RATE AUTOMATED: CPT | Performed by: NURSE PRACTITIONER

## 2021-10-29 PROCEDURE — 93971 EXTREMITY STUDY: CPT

## 2021-10-29 PROCEDURE — 93971 EXTREMITY STUDY: CPT | Performed by: SURGERY

## 2021-10-29 PROCEDURE — 84145 PROCALCITONIN (PCT): CPT | Performed by: NURSE PRACTITIONER

## 2021-10-29 PROCEDURE — 87040 BLOOD CULTURE FOR BACTERIA: CPT | Performed by: NURSE PRACTITIONER

## 2021-10-29 PROCEDURE — 96365 THER/PROPH/DIAG IV INF INIT: CPT

## 2021-10-29 PROCEDURE — 96375 TX/PRO/DX INJ NEW DRUG ADDON: CPT

## 2021-10-29 PROCEDURE — 83605 ASSAY OF LACTIC ACID: CPT | Performed by: NURSE PRACTITIONER

## 2021-10-29 PROCEDURE — 85610 PROTHROMBIN TIME: CPT | Performed by: NURSE PRACTITIONER

## 2021-10-29 PROCEDURE — 73060 X-RAY EXAM OF HUMERUS: CPT

## 2021-10-29 RX ORDER — BUPIVACAINE HCL/0.9 % NACL/PF 0.1 %
2 PLASTIC BAG, INJECTION (ML) EPIDURAL ONCE
Status: COMPLETED | OUTPATIENT
Start: 2021-10-29 | End: 2021-10-29

## 2021-10-29 RX ORDER — METHYLPREDNISOLONE SODIUM SUCCINATE 125 MG/2ML
125 INJECTION, POWDER, LYOPHILIZED, FOR SOLUTION INTRAMUSCULAR; INTRAVENOUS ONCE
Status: COMPLETED | OUTPATIENT
Start: 2021-10-29 | End: 2021-10-29

## 2021-10-29 RX ORDER — SODIUM CHLORIDE 0.9 % (FLUSH) 0.9 %
10 SYRINGE (ML) INJECTION AS NEEDED
Status: DISCONTINUED | OUTPATIENT
Start: 2021-10-29 | End: 2021-10-29 | Stop reason: HOSPADM

## 2021-10-29 RX ORDER — DOXYCYCLINE 100 MG/1
100 CAPSULE ORAL 2 TIMES DAILY
Qty: 20 CAPSULE | Refills: 0 | Status: SHIPPED | OUTPATIENT
Start: 2021-10-29 | End: 2021-11-08

## 2021-10-29 RX ORDER — DIPHENHYDRAMINE HYDROCHLORIDE 50 MG/ML
25 INJECTION INTRAMUSCULAR; INTRAVENOUS ONCE
Status: COMPLETED | OUTPATIENT
Start: 2021-10-29 | End: 2021-10-29

## 2021-10-29 RX ADMIN — CEFAZOLIN SODIUM 2 G: 10 INJECTION, POWDER, FOR SOLUTION INTRAVENOUS at 13:31

## 2021-10-29 RX ADMIN — METHYLPREDNISOLONE SODIUM SUCCINATE 125 MG: 125 INJECTION, POWDER, FOR SOLUTION INTRAMUSCULAR; INTRAVENOUS at 10:16

## 2021-10-29 RX ADMIN — SODIUM CHLORIDE, POTASSIUM CHLORIDE, SODIUM LACTATE AND CALCIUM CHLORIDE 1000 ML: 600; 310; 30; 20 INJECTION, SOLUTION INTRAVENOUS at 10:15

## 2021-10-29 RX ADMIN — DIPHENHYDRAMINE HYDROCHLORIDE 25 MG: 50 INJECTION, SOLUTION INTRAMUSCULAR; INTRAVENOUS at 10:16

## 2021-11-03 LAB
BACTERIA SPEC AEROBE CULT: NORMAL
BACTERIA SPEC AEROBE CULT: NORMAL

## 2024-03-04 NOTE — ANESTHESIA POSTPROCEDURE EVALUATION
"Patient: Domenic Vazquez    Procedure Summary     Date:  01/16/20 Room / Location:  [facility] / [facility]    Anesthesia Start:  1035 Anesthesia Stop:  1049    Procedure:  ESOPHAGOGASTRODUODENOSCOPY WITH ANESTHESIA (N/A ) Diagnosis:       Heme positive stool      Black stool      (Heme positive stool [R19.5])      (Black stool [K92.1])    Surgeon:  Nathanael Meyer MD Provider:  Kaity Bojorquez CRNA    Anesthesia Type:  MAC ASA Status:  3          Anesthesia Type: MAC    Vitals  No vitals data found for the desired time range.          Post Anesthesia Care and Evaluation    Patient location during evaluation: PHASE II  Patient participation: complete - patient participated  Level of consciousness: awake and alert  Pain management: adequate  Airway patency: patent  Anesthetic complications: No anesthetic complications    Cardiovascular status: acceptable  Respiratory status: acceptable  Hydration status: acceptable    Comments: Blood pressure 123/67, pulse 65, temperature 97.9 °F (36.6 °C), temperature source Oral, resp. rate 18, height 175.3 cm (69\"), weight 68 kg (150 lb), SpO2 97 %.    Pt discharged from PACU based on kirsten score >8      "
Statement Selected

## (undated) DEVICE — ENDOGATOR AUXILIARY WATER JET CONNECTOR: Brand: ENDOGATOR

## (undated) DEVICE — FRCP BX RADJAW4 NDL 2.8 240 STD OG

## (undated) DEVICE — SENSR O2 OXIMAX FNGR A/ 18IN NONSTR

## (undated) DEVICE — CUFF,BP,DISP,1 TUBE,ADULT,HP: Brand: MEDLINE

## (undated) DEVICE — YANKAUER,BULB TIP WITH VENT: Brand: ARGYLE

## (undated) DEVICE — THE CHANNEL CLEANING BRUSH IS A NYLON FLEXI BRUSH ATTACHED TO A FLEXIBLE PLASTIC SHEATH DESIGNED TO SAFELY REMOVE DEBRIS FROM FLEXIBLE ENDOSCOPES.

## (undated) DEVICE — CONMED SCOPE SAVER BITE BLOCK, 20X27 MM: Brand: SCOPE SAVER

## (undated) DEVICE — Device: Brand: DEFENDO AIR/WATER/SUCTION AND BIOPSY VALVE

## (undated) DEVICE — TBG SMPL FLTR LINE NASL 02/C02 A/ BX/100